# Patient Record
Sex: MALE | Race: OTHER | NOT HISPANIC OR LATINO
[De-identification: names, ages, dates, MRNs, and addresses within clinical notes are randomized per-mention and may not be internally consistent; named-entity substitution may affect disease eponyms.]

---

## 2017-01-10 ENCOUNTER — APPOINTMENT (OUTPATIENT)
Dept: HEART AND VASCULAR | Facility: CLINIC | Age: 52
End: 2017-01-10

## 2017-01-10 VITALS
HEIGHT: 69 IN | WEIGHT: 216 LBS | SYSTOLIC BLOOD PRESSURE: 134 MMHG | DIASTOLIC BLOOD PRESSURE: 86 MMHG | BODY MASS INDEX: 31.99 KG/M2 | HEART RATE: 67 BPM

## 2017-04-17 ENCOUNTER — APPOINTMENT (OUTPATIENT)
Dept: PULMONOLOGY | Facility: CLINIC | Age: 52
End: 2017-04-17

## 2017-04-17 VITALS
HEIGHT: 69 IN | RESPIRATION RATE: 17 BRPM | OXYGEN SATURATION: 98 % | SYSTOLIC BLOOD PRESSURE: 120 MMHG | WEIGHT: 205 LBS | BODY MASS INDEX: 30.36 KG/M2 | HEART RATE: 55 BPM | DIASTOLIC BLOOD PRESSURE: 80 MMHG

## 2017-05-16 ENCOUNTER — OUTPATIENT (OUTPATIENT)
Dept: OUTPATIENT SERVICES | Facility: HOSPITAL | Age: 52
LOS: 1 days | End: 2017-05-16
Payer: COMMERCIAL

## 2017-05-16 ENCOUNTER — APPOINTMENT (OUTPATIENT)
Dept: SLEEP CENTER | Facility: CLINIC | Age: 52
End: 2017-05-16

## 2017-05-16 PROCEDURE — 95810 POLYSOM 6/> YRS 4/> PARAM: CPT

## 2017-05-17 DIAGNOSIS — G47.33 OBSTRUCTIVE SLEEP APNEA (ADULT) (PEDIATRIC): ICD-10-CM

## 2017-05-30 ENCOUNTER — RESULT REVIEW (OUTPATIENT)
Age: 52
End: 2017-05-30

## 2017-05-30 ENCOUNTER — APPOINTMENT (OUTPATIENT)
Dept: PULMONOLOGY | Facility: CLINIC | Age: 52
End: 2017-05-30

## 2017-05-30 VITALS
BODY MASS INDEX: 31.1 KG/M2 | HEART RATE: 75 BPM | OXYGEN SATURATION: 96 % | WEIGHT: 210 LBS | DIASTOLIC BLOOD PRESSURE: 80 MMHG | RESPIRATION RATE: 17 BRPM | HEIGHT: 69 IN | SYSTOLIC BLOOD PRESSURE: 110 MMHG

## 2017-05-30 RX ORDER — CIPROFLOXACIN HYDROCHLORIDE 500 MG/1
500 TABLET, FILM COATED ORAL
Qty: 20 | Refills: 0 | Status: COMPLETED | COMMUNITY
Start: 2017-02-06

## 2017-05-30 RX ORDER — ALPRAZOLAM 0.5 MG/1
0.5 TABLET ORAL
Qty: 90 | Refills: 0 | Status: COMPLETED | COMMUNITY
Start: 2016-11-07

## 2017-05-30 RX ORDER — VALACYCLOVIR 1 G/1
1 TABLET, FILM COATED ORAL
Qty: 20 | Refills: 0 | Status: COMPLETED | COMMUNITY
Start: 2016-02-11

## 2017-06-03 ENCOUNTER — RESULT REVIEW (OUTPATIENT)
Age: 52
End: 2017-06-03

## 2017-07-06 ENCOUNTER — APPOINTMENT (OUTPATIENT)
Dept: SLEEP CENTER | Facility: CLINIC | Age: 52
End: 2017-07-06

## 2017-07-10 ENCOUNTER — OUTPATIENT (OUTPATIENT)
Dept: OUTPATIENT SERVICES | Facility: HOSPITAL | Age: 52
LOS: 1 days | End: 2017-07-10
Payer: COMMERCIAL

## 2017-07-10 PROCEDURE — G0400: CPT

## 2017-07-11 ENCOUNTER — APPOINTMENT (OUTPATIENT)
Dept: HEART AND VASCULAR | Facility: CLINIC | Age: 52
End: 2017-07-11

## 2017-07-11 VITALS
HEART RATE: 68 BPM | BODY MASS INDEX: 31.25 KG/M2 | DIASTOLIC BLOOD PRESSURE: 77 MMHG | SYSTOLIC BLOOD PRESSURE: 113 MMHG | HEIGHT: 69 IN | WEIGHT: 211 LBS

## 2017-07-14 DIAGNOSIS — G47.33 OBSTRUCTIVE SLEEP APNEA (ADULT) (PEDIATRIC): ICD-10-CM

## 2017-08-17 ENCOUNTER — APPOINTMENT (OUTPATIENT)
Dept: PULMONOLOGY | Facility: CLINIC | Age: 52
End: 2017-08-17
Payer: COMMERCIAL

## 2017-08-17 VITALS
HEART RATE: 72 BPM | SYSTOLIC BLOOD PRESSURE: 126 MMHG | BODY MASS INDEX: 30.36 KG/M2 | HEIGHT: 69 IN | RESPIRATION RATE: 17 BRPM | DIASTOLIC BLOOD PRESSURE: 80 MMHG | OXYGEN SATURATION: 97 % | WEIGHT: 205 LBS

## 2017-08-17 PROCEDURE — 99406 BEHAV CHNG SMOKING 3-10 MIN: CPT

## 2017-08-17 PROCEDURE — 94010 BREATHING CAPACITY TEST: CPT

## 2017-08-17 PROCEDURE — 99214 OFFICE O/P EST MOD 30 MIN: CPT | Mod: 25

## 2017-08-17 RX ORDER — DOXYCYCLINE 100 MG/1
100 CAPSULE ORAL
Qty: 14 | Refills: 0 | Status: DISCONTINUED | COMMUNITY
Start: 2017-05-30 | End: 2017-08-17

## 2017-12-14 ENCOUNTER — APPOINTMENT (OUTPATIENT)
Dept: PULMONOLOGY | Facility: CLINIC | Age: 52
End: 2017-12-14

## 2018-01-23 ENCOUNTER — APPOINTMENT (OUTPATIENT)
Dept: HEART AND VASCULAR | Facility: CLINIC | Age: 53
End: 2018-01-23
Payer: COMMERCIAL

## 2018-01-23 VITALS
DIASTOLIC BLOOD PRESSURE: 84 MMHG | HEIGHT: 69 IN | WEIGHT: 215 LBS | SYSTOLIC BLOOD PRESSURE: 140 MMHG | HEART RATE: 64 BPM | BODY MASS INDEX: 31.84 KG/M2

## 2018-01-23 PROCEDURE — 93282 PRGRMG EVAL IMPLANTABLE DFB: CPT

## 2018-01-23 PROCEDURE — 99214 OFFICE O/P EST MOD 30 MIN: CPT | Mod: 25

## 2018-01-23 RX ORDER — OLOPATADINE HYDROCHLORIDE 665 UG/1
0.6 SPRAY, METERED NASAL
Qty: 3 | Refills: 1 | Status: DISCONTINUED | COMMUNITY
Start: 2017-04-17 | End: 2018-01-23

## 2018-01-23 RX ORDER — LEVOFLOXACIN 500 MG/1
500 TABLET, FILM COATED ORAL
Qty: 10 | Refills: 0 | Status: DISCONTINUED | COMMUNITY
Start: 2017-05-26 | End: 2018-01-23

## 2018-01-23 RX ORDER — ALPRAZOLAM 2 MG/1
TABLET ORAL
Refills: 0 | Status: ACTIVE | COMMUNITY

## 2018-01-23 RX ORDER — OLOPATADINE HCL 1 MG/ML
0.1 SOLUTION/ DROPS OPHTHALMIC TWICE DAILY
Qty: 3 | Refills: 1 | Status: DISCONTINUED | COMMUNITY
Start: 2017-04-17 | End: 2018-01-23

## 2018-01-23 RX ORDER — DICLOFENAC SODIUM AND MISOPROSTOL 50; 200 MG/1; UG/1
50-0.2 TABLET, DELAYED RELEASE ORAL
Qty: 60 | Refills: 3 | Status: DISCONTINUED | COMMUNITY
Start: 2017-04-17 | End: 2018-01-23

## 2018-10-02 ENCOUNTER — APPOINTMENT (OUTPATIENT)
Dept: HEART AND VASCULAR | Facility: CLINIC | Age: 53
End: 2018-10-02
Payer: COMMERCIAL

## 2018-10-02 VITALS
BODY MASS INDEX: 33.33 KG/M2 | HEIGHT: 69 IN | HEART RATE: 69 BPM | WEIGHT: 225 LBS | DIASTOLIC BLOOD PRESSURE: 83 MMHG | SYSTOLIC BLOOD PRESSURE: 144 MMHG

## 2018-10-02 PROCEDURE — 99214 OFFICE O/P EST MOD 30 MIN: CPT | Mod: 25

## 2018-10-02 PROCEDURE — 93282 PRGRMG EVAL IMPLANTABLE DFB: CPT

## 2019-02-05 ENCOUNTER — APPOINTMENT (OUTPATIENT)
Dept: PULMONOLOGY | Facility: CLINIC | Age: 54
End: 2019-02-05
Payer: COMMERCIAL

## 2019-02-05 ENCOUNTER — NON-APPOINTMENT (OUTPATIENT)
Age: 54
End: 2019-02-05

## 2019-02-05 VITALS
WEIGHT: 236 LBS | BODY MASS INDEX: 34.96 KG/M2 | HEART RATE: 62 BPM | OXYGEN SATURATION: 97 % | HEIGHT: 69 IN | SYSTOLIC BLOOD PRESSURE: 110 MMHG | RESPIRATION RATE: 17 BRPM | DIASTOLIC BLOOD PRESSURE: 80 MMHG

## 2019-02-05 DIAGNOSIS — R09.82 POSTNASAL DRIP: ICD-10-CM

## 2019-02-05 DIAGNOSIS — J18.9 PNEUMONIA, UNSPECIFIED ORGANISM: ICD-10-CM

## 2019-02-05 DIAGNOSIS — Z87.891 PERSONAL HISTORY OF NICOTINE DEPENDENCE: ICD-10-CM

## 2019-02-05 PROCEDURE — 94010 BREATHING CAPACITY TEST: CPT

## 2019-02-05 PROCEDURE — 95012 NITRIC OXIDE EXP GAS DETER: CPT

## 2019-02-05 PROCEDURE — 99215 OFFICE O/P EST HI 40 MIN: CPT | Mod: 25

## 2019-02-05 NOTE — REASON FOR VISIT
[Follow-Up] : a follow-up visit [FreeTextEntry1] : abnormal chest CT, asthma, allergies, COPD, GERD, and prior b/l PNA

## 2019-02-05 NOTE — PROCEDURE
[FreeTextEntry1] : PFT revealed normal flows, with a FEV1 of  3.02  ,which is 81% of predicted, with a normal flow volume loop\par \par FENO was 21 ; a normal value being less than 25\par Fractional exhaled nitric oxide (FENO) is regarded as a simple, noninvasive method for assessing eosinophilic airway inflammation. Produced by a variety of cells within the lung, nitric oxide (NO) concentrations are generally low in healthy individuals. However, high concentrations of NO appear to be involved in nonspecific host defense mechanisms and chronic inflammatory diseases such as asthma. The American Thoracic Society (ATS) therefore has recommended using FENO to aid in the diagnosis and monitoring of eosinophilic airway inflammation and asthma, and for identifying steroid responsive individuals whose chronic respiratory symptoms may be caused by airway inflammation. \par  \par Chest CT (8.18.17) revealed stable CT of the chest with apical emphysema and multiple stable subcentimeter noncalcified pulmonary nodules measuring up to 6 mm. There is no new pulmonary nodule, thoracic lymphadenopathy, or active pulmonary parenchymal disease\par \par APAP Sleep study ( 7.10.17) revealed AHI was 5.7. Mask leakage was excessive. The 95th percentile PAP pressure was 11 cmH2O\par \par Sleep study (5.16.17) revealed severe SUMAYA was observed. CPAP titration polysomnography for treatment of sleep disordered breathing is suggested if clinically indicated \par

## 2019-02-05 NOTE — HISTORY OF PRESENT ILLNESS
[FreeTextEntry1] : Mr. Castellon is a 53 year old male presenting to the office today for a follow up visit for abnormal chest CT, asthma, allergies, COPD, GERD, and OSAS. 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

## 2019-02-05 NOTE — ADDENDUM
[FreeTextEntry1] : Documented by Noe Robins acting as a scribe for Dr. Kt Ac on 02/05/2019 \par \par All medical record entries made by the Scribe were at my, Dr. Kt Ac's, direction and personally dictated by me on 02/05/2019  . I have reviewed the chart and agree that the record accurately reflects my personal performance of the history, physical exam, procedure, assessment and plan. I have also personally directed, reviewed, and agree with the discharge instructions. \par \par

## 2019-02-05 NOTE — PHYSICAL EXAM

## 2019-02-05 NOTE — ASSESSMENT
[FreeTextEntry1] : Mr. Castellon has COPD, asthma, insomnia, OSAS allergies and sinus. Number one issue is still  his sleep and he has mild SOB \par \par problem 1: COPD/asthma\par -add Ventolin 2 inhalations QHS\par -add Trelegy 1 inhalation QD\par -on the shelf is Dulera\par \par -COPD is a progressive disease and although it can’t be cured , appropriate management can slow its progression, reduce frequency and severity of exacerbations, and improve symptoms and the patient quality of life. Hospitalizations are the greatest contributor to the total COPD costs and account for up to 87% of total COPD related costs. Exacerbations are the main cause of admissions and subsequently account for the 40-75% of COPD costs. Inhaled maintenance therapy reduces the incidence of exacerbations in patients with stable COPD. Incorrect inhaler use and nonadherence are major obstacles to achieving COPD treatment goals. Many COPD patients have challenges (impaired inhalation, limited dexterity, reduced cognition: that limit their ability to correctly use their COPD treatment devices resulting in reduced symptom control. Of most importance is smoking cessation and early intervention with respiratory illnesses and contemplation for pulmonary rehab to enhance quality of life.\par -Asthma is  believed to be caused by inherited (genetic) and environmental factor, but its exact cause is unknown. Asthma may be triggered by allergens, lung infections, or irritants in the air. Asthma triggers are different for each person\par \par problem 2: former smoking (resolved)\par -Discussed for five minutes with the patient the risks/associations with continued smoking including COPD, emphysema, shortness of breath, renal cancer, bladder cancer, stroke risk, cardiac disease, etc. Smoking cessation was discussed at length and highly encouraged. Various options to aid cessation was discussed including use of Chantix, Nicotrol, nicotine products, laser therapy, hypnosis, Wellbutrin, etc.\par \par problem 3 : allergic rhinitis\par -continue to use nasal saline\par -can continue Zyrtec 10 mg QHS \par -Flonase 1 sniff BID \par -continue  Olopatadine 1 sniff/nostril BID\par - continue Olopatadine eye drops \par \par -Environmental measures for allergies were encouraged including mattress and pillow cover, air purifier, and environmental controls.\par \par problem 5: abnormal chest CT (lung cancer screening CT)\par -follow up chest CT to be done at this time \par \par problem 6: Severe OSAS\par -he is awaiting his oral appliance  (insurance approval)\par -he is unable to tolerate CPAP\par -continue Lunesta 3 mg prn - 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  Cardiovascular disease risk includes arrhythmias, atrial fibrillation, hypertension, coronary artery disease, and stroke. Metabolic disorders include diabetes type 2, non-alcoholic fatty liver disease. Mood disorder especially depression; and cognitive decline especially in the elderly. Associations with  chronic reflux/Gamez’s esophagus some but not all inclusive. \par -Reasons to assess this include arousal consistent with hypopnea; respiratory events most prominent in REM sleep or supine position; therefore sleep staging and body position are important for accurate diagnosis and estimation of AHI.\par \par problem 7: overweight\par -Weight loss, exercise, and diet control were discussed and are highly encouraged. Treatment options were given such as, aqua therapy, and contacting a nutritionist. Recommended to use the elliptical, stationary bike, less use of treadmill.  Obesity is associated with worsening asthma, shortness of breath, and potential for cardiac disease, diabetes, and other underlying medical conditions. \par \par problem 8: GERD\par -diet controlled \par - -Things to avoid including overeating, spicy foods, tight clothing, eating within three hours of bed, this list is not all inclusive. \par -For treatment of reflux, possible options discussed including diet control, H2 blockers, PPIs, as well as coating motility agents discussed as treatment options. Timing of meals and proximity of last meal to sleep were discussed. If symptoms persist, a formal gastrointestinal evaluation is needed.\par \par F/U in 4 months\par He is encouraged to call with any changes, concerns, or questions.

## 2019-02-05 NOTE — REVIEW OF SYSTEMS
[Recent Wt Gain (___ Lbs)] : recent [unfilled] ~Ulb weight gain [As Noted in HPI] : as noted in HPI [Cough] : cough [Wheezing] : wheezing [Negative] : Sleep Disorder [FreeTextEntry8] : CAMPBELL

## 2019-03-07 ENCOUNTER — FORM ENCOUNTER (OUTPATIENT)
Age: 54
End: 2019-03-07

## 2019-03-08 ENCOUNTER — APPOINTMENT (OUTPATIENT)
Dept: CT IMAGING | Facility: CLINIC | Age: 54
End: 2019-03-08
Payer: COMMERCIAL

## 2019-03-08 ENCOUNTER — OUTPATIENT (OUTPATIENT)
Dept: OUTPATIENT SERVICES | Facility: HOSPITAL | Age: 54
LOS: 1 days | End: 2019-03-08
Payer: COMMERCIAL

## 2019-03-08 DIAGNOSIS — R93.89 ABNORMAL FINDINGS ON DIAGNOSTIC IMAGING OF OTHER SPECIFIED BODY STRUCTURES: ICD-10-CM

## 2019-03-08 DIAGNOSIS — Z00.8 ENCOUNTER FOR OTHER GENERAL EXAMINATION: ICD-10-CM

## 2019-03-08 PROCEDURE — 71250 CT THORAX DX C-: CPT | Mod: 26

## 2019-03-08 PROCEDURE — 71250 CT THORAX DX C-: CPT

## 2019-03-22 ENCOUNTER — TRANSCRIPTION ENCOUNTER (OUTPATIENT)
Age: 54
End: 2019-03-22

## 2019-04-09 ENCOUNTER — APPOINTMENT (OUTPATIENT)
Dept: HEART AND VASCULAR | Facility: CLINIC | Age: 54
End: 2019-04-09

## 2019-05-13 ENCOUNTER — FORM ENCOUNTER (OUTPATIENT)
Age: 54
End: 2019-05-13

## 2019-05-14 ENCOUNTER — APPOINTMENT (OUTPATIENT)
Dept: HEART AND VASCULAR | Facility: CLINIC | Age: 54
End: 2019-05-14
Payer: COMMERCIAL

## 2019-05-14 ENCOUNTER — APPOINTMENT (OUTPATIENT)
Dept: CARDIOTHORACIC SURGERY | Facility: CLINIC | Age: 54
End: 2019-05-14
Payer: COMMERCIAL

## 2019-05-14 ENCOUNTER — OUTPATIENT (OUTPATIENT)
Dept: OUTPATIENT SERVICES | Facility: HOSPITAL | Age: 54
LOS: 1 days | End: 2019-05-14
Payer: MEDICARE

## 2019-05-14 ENCOUNTER — NON-APPOINTMENT (OUTPATIENT)
Age: 54
End: 2019-05-14

## 2019-05-14 VITALS
DIASTOLIC BLOOD PRESSURE: 82 MMHG | BODY MASS INDEX: 33.33 KG/M2 | SYSTOLIC BLOOD PRESSURE: 140 MMHG | OXYGEN SATURATION: 97 % | WEIGHT: 225 LBS | HEIGHT: 69 IN | HEART RATE: 58 BPM | RESPIRATION RATE: 17 BRPM

## 2019-05-14 VITALS
SYSTOLIC BLOOD PRESSURE: 131 MMHG | DIASTOLIC BLOOD PRESSURE: 86 MMHG | BODY MASS INDEX: 33.33 KG/M2 | HEIGHT: 69 IN | HEART RATE: 59 BPM | WEIGHT: 225 LBS

## 2019-05-14 DIAGNOSIS — F17.290 NICOTINE DEPENDENCE, OTHER TOBACCO PRODUCT, UNCOMPLICATED: ICD-10-CM

## 2019-05-14 DIAGNOSIS — Z80.52 FAMILY HISTORY OF MALIGNANT NEOPLASM OF BLADDER: ICD-10-CM

## 2019-05-14 DIAGNOSIS — Z82.0 FAMILY HISTORY OF EPILEPSY AND OTHER DISEASES OF THE NERVOUS SYSTEM: ICD-10-CM

## 2019-05-14 DIAGNOSIS — T82.118A BREAKDOWN (MECHANICAL) OF OTHER CARDIAC ELECTRONIC DEVICE, INITIAL ENCOUNTER: ICD-10-CM

## 2019-05-14 LAB
ALBUMIN SERPL ELPH-MCNC: 4.4 G/DL — SIGNIFICANT CHANGE UP (ref 3.3–5)
ALP SERPL-CCNC: 47 U/L — SIGNIFICANT CHANGE UP (ref 40–120)
ALT FLD-CCNC: 20 U/L — SIGNIFICANT CHANGE UP (ref 10–45)
ANION GAP SERPL CALC-SCNC: 11 MMOL/L — SIGNIFICANT CHANGE UP (ref 5–17)
APPEARANCE UR: CLEAR — SIGNIFICANT CHANGE UP
APTT BLD: 31.4 SEC — SIGNIFICANT CHANGE UP (ref 27.5–36.3)
AST SERPL-CCNC: 16 U/L — SIGNIFICANT CHANGE UP (ref 10–40)
BASOPHILS # BLD AUTO: 0.03 K/UL — SIGNIFICANT CHANGE UP (ref 0–0.2)
BASOPHILS NFR BLD AUTO: 0.4 % — SIGNIFICANT CHANGE UP (ref 0–2)
BILIRUB SERPL-MCNC: 0.3 MG/DL — SIGNIFICANT CHANGE UP (ref 0.2–1.2)
BILIRUB UR-MCNC: NEGATIVE — SIGNIFICANT CHANGE UP
BLD GP AB SCN SERPL QL: NEGATIVE — SIGNIFICANT CHANGE UP
BUN SERPL-MCNC: 18 MG/DL — SIGNIFICANT CHANGE UP (ref 7–23)
CALCIUM SERPL-MCNC: 9.1 MG/DL — SIGNIFICANT CHANGE UP (ref 8.4–10.5)
CHLORIDE SERPL-SCNC: 104 MMOL/L — SIGNIFICANT CHANGE UP (ref 96–108)
CHOLEST SERPL-MCNC: 198 MG/DL — SIGNIFICANT CHANGE UP (ref 10–199)
CO2 SERPL-SCNC: 21 MMOL/L — LOW (ref 22–31)
COLOR SPEC: YELLOW — SIGNIFICANT CHANGE UP
CREAT SERPL-MCNC: 0.87 MG/DL — SIGNIFICANT CHANGE UP (ref 0.5–1.3)
DIFF PNL FLD: NEGATIVE — SIGNIFICANT CHANGE UP
EOSINOPHIL # BLD AUTO: 0.18 K/UL — SIGNIFICANT CHANGE UP (ref 0–0.5)
EOSINOPHIL NFR BLD AUTO: 2.4 % — SIGNIFICANT CHANGE UP (ref 0–6)
GLUCOSE SERPL-MCNC: 90 MG/DL — SIGNIFICANT CHANGE UP (ref 70–99)
GLUCOSE UR QL: NEGATIVE — SIGNIFICANT CHANGE UP
HBA1C BLD-MCNC: 5.8 % — HIGH (ref 4–5.6)
HBV SURFACE AG SER-ACNC: SIGNIFICANT CHANGE UP
HCT VFR BLD CALC: 44.5 % — SIGNIFICANT CHANGE UP (ref 39–50)
HDLC SERPL-MCNC: 46 MG/DL — SIGNIFICANT CHANGE UP
HGB BLD-MCNC: 14.6 G/DL — SIGNIFICANT CHANGE UP (ref 13–17)
IMM GRANULOCYTES NFR BLD AUTO: 0.4 % — SIGNIFICANT CHANGE UP (ref 0–1.5)
INR BLD: 1.03 — SIGNIFICANT CHANGE UP (ref 0.88–1.16)
KETONES UR-MCNC: NEGATIVE — SIGNIFICANT CHANGE UP
LEUKOCYTE ESTERASE UR-ACNC: NEGATIVE — SIGNIFICANT CHANGE UP
LIPID PNL WITH DIRECT LDL SERPL: 138 MG/DL — HIGH
LYMPHOCYTES # BLD AUTO: 1.85 K/UL — SIGNIFICANT CHANGE UP (ref 1–3.3)
LYMPHOCYTES # BLD AUTO: 24.3 % — SIGNIFICANT CHANGE UP (ref 13–44)
MCHC RBC-ENTMCNC: 28.9 PG — SIGNIFICANT CHANGE UP (ref 27–34)
MCHC RBC-ENTMCNC: 32.8 GM/DL — SIGNIFICANT CHANGE UP (ref 32–36)
MCV RBC AUTO: 87.9 FL — SIGNIFICANT CHANGE UP (ref 80–100)
MONOCYTES # BLD AUTO: 0.59 K/UL — SIGNIFICANT CHANGE UP (ref 0–0.9)
MONOCYTES NFR BLD AUTO: 7.7 % — SIGNIFICANT CHANGE UP (ref 2–14)
NEUTROPHILS # BLD AUTO: 4.94 K/UL — SIGNIFICANT CHANGE UP (ref 1.8–7.4)
NEUTROPHILS NFR BLD AUTO: 64.8 % — SIGNIFICANT CHANGE UP (ref 43–77)
NITRITE UR-MCNC: NEGATIVE — SIGNIFICANT CHANGE UP
NRBC # BLD: 0 /100 WBCS — SIGNIFICANT CHANGE UP (ref 0–0)
PH UR: 6 — SIGNIFICANT CHANGE UP (ref 5–8)
PLATELET # BLD AUTO: 267 K/UL — SIGNIFICANT CHANGE UP (ref 150–400)
POTASSIUM SERPL-MCNC: 4.2 MMOL/L — SIGNIFICANT CHANGE UP (ref 3.5–5.3)
POTASSIUM SERPL-SCNC: 4.2 MMOL/L — SIGNIFICANT CHANGE UP (ref 3.5–5.3)
PROT SERPL-MCNC: 7.8 G/DL — SIGNIFICANT CHANGE UP (ref 6–8.3)
PROT UR-MCNC: NEGATIVE MG/DL — SIGNIFICANT CHANGE UP
PROTHROM AB SERPL-ACNC: 11.6 SEC — SIGNIFICANT CHANGE UP (ref 10–12.9)
RBC # BLD: 5.06 M/UL — SIGNIFICANT CHANGE UP (ref 4.2–5.8)
RBC # FLD: 12.6 % — SIGNIFICANT CHANGE UP (ref 10.3–14.5)
RH IG SCN BLD-IMP: POSITIVE — SIGNIFICANT CHANGE UP
SODIUM SERPL-SCNC: 136 MMOL/L — SIGNIFICANT CHANGE UP (ref 135–145)
SP GR SPEC: 1.02 — SIGNIFICANT CHANGE UP (ref 1–1.03)
TOTAL CHOLESTEROL/HDL RATIO MEASUREMENT: 4.3 RATIO — SIGNIFICANT CHANGE UP (ref 3.4–9.6)
TRIGL SERPL-MCNC: 71 MG/DL — SIGNIFICANT CHANGE UP (ref 10–149)
TSH SERPL-MCNC: 1.18 UIU/ML — SIGNIFICANT CHANGE UP (ref 0.35–4.94)
UROBILINOGEN FLD QL: 0.2 E.U./DL — SIGNIFICANT CHANGE UP
WBC # BLD: 7.62 K/UL — SIGNIFICANT CHANGE UP (ref 3.8–10.5)
WBC # FLD AUTO: 7.62 K/UL — SIGNIFICANT CHANGE UP (ref 3.8–10.5)

## 2019-05-14 PROCEDURE — 80061 LIPID PANEL: CPT

## 2019-05-14 PROCEDURE — 86901 BLOOD TYPING SEROLOGIC RH(D): CPT

## 2019-05-14 PROCEDURE — 99204 OFFICE O/P NEW MOD 45 MIN: CPT

## 2019-05-14 PROCEDURE — 71046 X-RAY EXAM CHEST 2 VIEWS: CPT | Mod: 26

## 2019-05-14 PROCEDURE — 36415 COLL VENOUS BLD VENIPUNCTURE: CPT

## 2019-05-14 PROCEDURE — 84443 ASSAY THYROID STIM HORMONE: CPT

## 2019-05-14 PROCEDURE — 93282 PRGRMG EVAL IMPLANTABLE DFB: CPT

## 2019-05-14 PROCEDURE — 80053 COMPREHEN METABOLIC PANEL: CPT

## 2019-05-14 PROCEDURE — 86850 RBC ANTIBODY SCREEN: CPT

## 2019-05-14 PROCEDURE — 86900 BLOOD TYPING SEROLOGIC ABO: CPT

## 2019-05-14 PROCEDURE — 71046 X-RAY EXAM CHEST 2 VIEWS: CPT

## 2019-05-14 PROCEDURE — 99214 OFFICE O/P EST MOD 30 MIN: CPT | Mod: 25

## 2019-05-14 PROCEDURE — 85730 THROMBOPLASTIN TIME PARTIAL: CPT

## 2019-05-14 PROCEDURE — 85610 PROTHROMBIN TIME: CPT

## 2019-05-14 PROCEDURE — 83036 HEMOGLOBIN GLYCOSYLATED A1C: CPT

## 2019-05-14 PROCEDURE — 87340 HEPATITIS B SURFACE AG IA: CPT

## 2019-05-14 PROCEDURE — 85025 COMPLETE CBC W/AUTO DIFF WBC: CPT

## 2019-05-14 PROCEDURE — 81003 URINALYSIS AUTO W/O SCOPE: CPT

## 2019-05-14 PROCEDURE — 93000 ELECTROCARDIOGRAM COMPLETE: CPT

## 2019-05-14 RX ORDER — CEPHALEXIN 500 MG/1
500 CAPSULE ORAL
Qty: 20 | Refills: 0 | Status: COMPLETED | COMMUNITY
Start: 2019-05-03

## 2019-05-14 RX ORDER — SULFAMETHOXAZOLE AND TRIMETHOPRIM 800; 160 MG/1; MG/1
800-160 TABLET ORAL
Qty: 10 | Refills: 0 | Status: COMPLETED | COMMUNITY
Start: 2019-05-09

## 2019-05-14 RX ORDER — FLUTICASONE FUROATE, UMECLIDINIUM BROMIDE AND VILANTEROL TRIFENATATE 100; 62.5; 25 UG/1; UG/1; UG/1
100-62.5-25 POWDER RESPIRATORY (INHALATION)
Qty: 3 | Refills: 1 | Status: DISCONTINUED | COMMUNITY
Start: 2019-02-05 | End: 2019-05-14

## 2019-05-14 RX ORDER — BENZOCAINE 20 G/100G
100 GEL, DENTIFRICE ORAL
Qty: 30 | Refills: 0 | Status: COMPLETED | COMMUNITY
Start: 2019-04-22

## 2019-05-14 RX ORDER — SILDENAFIL CITRATE 100 MG/1
TABLET, FILM COATED ORAL
Refills: 0 | Status: DISCONTINUED | COMMUNITY
End: 2019-05-14

## 2019-05-14 RX ORDER — ALBUTEROL SULFATE 90 UG/1
108 (90 BASE) AEROSOL, METERED RESPIRATORY (INHALATION) EVERY 6 HOURS
Qty: 1 | Refills: 3 | Status: DISCONTINUED | COMMUNITY
Start: 2019-02-05 | End: 2019-05-14

## 2019-05-14 RX ORDER — OXYCODONE 5 MG/1
5 TABLET ORAL
Qty: 12 | Refills: 0 | Status: COMPLETED | COMMUNITY
Start: 2019-04-22

## 2019-05-14 NOTE — PROCEDURE
[No] : not [NSR] : normal sinus rhythm [ICD] : Implantable cardioverter-defibrillator [Medtronic] : Medtronic [VVI] : VVI [Voltage: ___ volts] : Voltage was [unfilled] volts [Lead Imp:  ___ohms] : lead impedance was [unfilled] ohms [Sensing Amplitude ___mv] : sensing amplitude was [unfilled] mv [___V @] : [unfilled] V [___ ms] : [unfilled] ms [None] : none [Sense ___ %] : Sense [unfilled]% [de-identified] : Protecta [de-identified] : 2/21/13 [de-identified] : 40

## 2019-05-14 NOTE — END OF VISIT
[] : Nurse Practitioner [>50% of Time Spent on Counseling for ____] : Greater than 50% of the encounter time was spent on counseling for [unfilled] [>50% of Time Spent on Counseling and Coordination of Care for  ___] : Greater than 50% of the encounter time was spent on counseling and coordination of care for [unfilled] [Time Spent: ___ minutes] : I have spent [unfilled] minutes of face to face time with the patient

## 2019-05-14 NOTE — PHYSICAL EXAM
[General Appearance - Well Developed] : well developed [Normal Appearance] : normal appearance [Well Groomed] : well groomed [General Appearance - Well Nourished] : well nourished [No Deformities] : no deformities [General Appearance - In No Acute Distress] : no acute distress [Left Infraclavicular] : left infraclavicular area [Clean] : clean [Dry] : dry [Well-Healed] : well-healed [Palpable Crepitus] : no palpable crepitus [Bleeding] : no active bleeding [Purulent Drainage] : no purulent drainage [Foul Odor] : no foul smell [Erythema] : not erythematous [Serous Drainage] : no serous drainage [Serosanguineous Drainage] : no serosanquineous drainage [Warm] : not warm [Tender] : not tender [Indurated] : not indurated [Fluctuant] : not fluctuant

## 2019-05-14 NOTE — HISTORY OF PRESENT ILLNESS
[None] : The patient complains of no symptoms [de-identified] : This is a 52 yo man with a history of VT S/P ICD implant with ICD generator change 2/21/13.  His RV lead is a [employer] [place] [id number], which is the subject of industry alert.  Reports he feels "PVCs" occasionally while resting; no sustained rapid/irregular heart action.  Compliant with Atenolol.  No presyncope/syncope or ICD shocks.  \par \par Presents today for f/u after hearing his device beeping.  Remote monitoring significant for RV lead integrity alert and noise on RV lead.

## 2019-05-15 PROBLEM — T82.118A ICD (IMPLANTABLE CARDIOVERTER-DEFIBRILLATOR) MALFUNCTION: Status: ACTIVE | Noted: 2019-05-14

## 2019-05-16 NOTE — H&P ADULT - NSHPREVIEWOFSYSTEMS_GEN_ALL_CORE
Review of Systems  CONSTITUTIONAL:  Denies Fevers / chills, sweats, fatigue, weight loss, weight gain                                      NEURO:  Denies parathesias, seizures, syncope, confusion                                                                                EYES:  Denies Blurry vision, discharge, pain, loss of vision                                                                                    ENMT:  Denies Difficulty hearing, vertigo, dysphagia, epistaxis, recent dental work                                       CV:  Denies Chest pain, palpitations, CAMPBELL, orthopnea                                                                                          RESPIRATORY:  Denies Wheezing, SOB, cough / sputum, hemoptysis                                                                GI:  Denies Nausea, vommiting, diarrhea, constipation, melena, difficulty swallowing                                               : Denies Hematuria, dysuria, urgency, incontinence                                                                                         MUSKULOSKELETAL:  Denies arthritis, joint swelling, muscle weakness                                                             SKIN/BREAST:  Denies rash, itching, janiya loss, masses                                                                                            PSYCH:  Denies depresion, anxiety, suicidal ideation                                                                                               HEME/LYMPH:  Denies bruises easily, enlarged lymph nodes, tender lymph nodes                                        ENDOCRINE:  Denies cold intolerance, heat intolerance, polydipsia

## 2019-05-16 NOTE — H&P ADULT - NSICDXPASTMEDICALHX_GEN_ALL_CORE_FT
PAST MEDICAL HISTORY:  H/O ventricular tachycardia     Malfunction of implantable cardioverter-defibrillator (ICD) electrode     SUMAYA (obstructive sleep apnea)     S/P ICD (internal cardiac defibrillator) procedure

## 2019-05-16 NOTE — H&P ADULT - NSHPPHYSICALEXAM_GEN_ALL_CORE
Vital Signs   	Recorded: [id number] 04:09PM  Systolic	140  Diastolic	82  Height	5 ft 9 in  Weight	225 lb   BMI Calculated	33.23  BSA Calculated	2.17  Heart Rate	58  Respiration	17  O2 Saturation	97    Physical Exam  Constitutional: alert and in no acute distress.   Eyes: the sclera and conjunctiva were normal, pupils were equal in size, round, and reactive to light and extraocular movements were intact.   ENT: the ears and nose were normal in appearance . the oropharynx was normal.   Neck: the appearance of the neck was normal, no neck mass was observed, the thyroid was not enlarged and there were no palpable thyroid nodules . there was no jugular-venous distention.   Pulmonary: no respiratory distress and lungs were clear to auscultation bilaterally.   Heart: heart rate was normal and rhythm regular, normal S1 and S2, no gallops, no murmurs and no pericardial rub.   Chest: the chest was normal in appearance, no chest asymmetry and normal chest expansion. ICD left chest wall.   Vascular: Carotid: right 2+, left 2+, no bruit heard over the right carotid and no bruit heard over the left carotid. Radial: right 2+ and left 2+.   Abdominal Aorta: the abdominal aorta was not enlarged and no bruit was heard.   Breasts:. deferred.   Abdomen: normal bowel sounds, soft, non-tender, no hepato-splenomegaly and no abdominal mass palpated.   Genitourinary:. deferred.   Lymphatics: The posterior cervical and anterior cervical nodes were non-tender and normal size.   Back: no costovertebral angle tenderness and no spinal tenderness.   Musculoskeletal: normal gait, no clubbing or cyanosis of the fingernails, no joint swelling seen and muscle strength and tone were normal.   Skin: normal skin color and pigmentation, normal skin turgor and no rash.   Neurological: deep tendon reflexes were 2+ and symmetric, the sensory exam was normal to light touch and pinprick and no focal deficits.   Psychiatric: oriented to person, place, and time, insight and judgment were intact and the affect was normal.

## 2019-05-16 NOTE — H&P ADULT - NSICDXPASTSURGICALHX_GEN_ALL_CORE_FT
PAST SURGICAL HISTORY:  H/O knee surgery     H/O shoulder surgery     S/P ICD (internal cardiac defibrillator) procedure PAST SURGICAL HISTORY:  H/O knee surgery     H/O shoulder surgery     S/P ICD (internal cardiac defibrillator) procedure     S/P vasectomy

## 2019-05-16 NOTE — H&P ADULT - HISTORY OF PRESENT ILLNESS
53 year old male, former smoker (smoked for 30yrs, 1PPD, quit 10/2018), with a past medical history of SUMAYA (unable to tolerate CPAP, improved after 30lbs weight loss), history of VT S/P ICD implant 1/10/2005, with ICD generator change 2/21/13 presents for surgical consult. Patient's RV lead is a Sprint [place] [id number] which is the subject of industry alert. He has been hearing beeping from the device since 5/9/19.     Otherwise, patient is doing well. He denies fever, chills, fatigue, headache, blurred vision, dizziness, syncope, chest pain, palpitations, shortness of breath, orthopnea, paroxysmal nocturnal dyspnea, nausea, vomiting, abdominal pain, back pain, BRBPR or swelling to legs.    Of note, patient underwent a vasectomy ~ 3 wks ago. He is currently on PO antibiotics (Keflex, Bactrim) for infected cyst which has healed.     The patient was evaluated by CT surgery and is deemed a surgical candidate for Laser lead extraction of RV lead, implantation of new ICD (Medtronic). 53 year old male, former smoker (smoked for 30yrs, 1PPD, quit 10/2018), with a past medical history of SUMAYA (unable to tolerate CPAP, improved after 30lbs weight loss), history of VT S/P ICD implant 1/10/2005, with ICD generator change 2/21/13 presents for surgical consult. Patient's RV lead is a Sprint Andrea [id number] which is the subject of industry alert. He has been hearing beeping from the device since 5/9/19.     Otherwise, patient is doing well. He denies fever, chills, fatigue, headache, blurred vision, dizziness, syncope, chest pain, palpitations, shortness of breath, orthopnea, paroxysmal nocturnal dyspnea, nausea, vomiting, abdominal pain, back pain, BRBPR or swelling to legs.    Of note, patient underwent a vasectomy ~ 3 wks ago. He is currently on PO antibiotics (Keflex, Bactrim) for infected cyst which has healed.     The patient was evaluated by CT surgery and is deemed a surgical candidate for Laser lead extraction of RV lead, implantation of new ICD (Medtronic).  Procedure is scheduled for today. 53 year old male, former smoker (smoked for 30yrs, 1PPD, quit 10/2018), with a past medical history of SUMAYA (unable to tolerate CPAP, improved after 30lbs weight loss), history of VT S/P ICD implant 1/10/2005, with ICD generator change 2/21/13 presents for surgical consult. Patient's RV lead is a Sprint Andrea [id number] which is the subject of industry alert. He has been hearing beeping from the device since 5/9/19.     Otherwise, patient is doing well. He denies fever, chills, fatigue, headache, blurred vision, dizziness, syncope, chest pain, palpitations, shortness of breath, orthopnea, paroxysmal nocturnal dyspnea, nausea, vomiting, abdominal pain, back pain, BRBPR or swelling to legs.    Of note, patient underwent a vasectomy ~ 3 wks ago. He is currently on PO antibiotics (Keflex, Bactrim) for infected cyst which has healed.     The patient was evaluated by CT surgery and is deemed a surgical candidate for Laser lead extraction of RV lead, implantation of new ICD (Medtronic).  Procedure is scheduled for today.      Patient seen in same day holding area; Reports no changes to PMHx or medications since last seen by our team. Denies acute or current SOB, chest pain, palpitation, N/V/D, fever/chills, recent illness, or any other concerning symptoms.      Pt last ate at 7pm.    Took no medication this AM.  Did use cleaning wipes as directed.  Feels well. 53 year old male, former smoker (smoked for 30yrs, 1PPD, quit 10/2018), with a past medical history of SUMAYA (unable to tolerate CPAP, improved after 30lbs weight loss), history of VT S/P ICD implant 1/10/2005, with ICD generator change 2/21/13 presents for surgical consult. Patient's RV lead is a Sprint Andrea [id number] which is the subject of industry alert. He has been hearing beeping from the device since 5/9/19.     Otherwise, patient is doing well. He denies fever, chills, fatigue, headache, blurred vision, dizziness, syncope, chest pain, palpitations, shortness of breath, orthopnea, paroxysmal nocturnal dyspnea, nausea, vomiting, abdominal pain, back pain, BRBPR or swelling to legs.    Of note, patient underwent a vasectomy recently.  He completed PO antibiotics (Keflex, Bactrim) for infected cyst which has healed and he is without complaints..     The patient was evaluated by CT surgery and is deemed a surgical candidate for Laser lead extraction of RV lead, implantation of new ICD (Medtronic).  Procedure is scheduled for today.      Patient seen in same day holding area; Reports no changes to PMHx or medications since last seen by our team. Denies acute or current SOB, chest pain, palpitation, N/V/D, fever/chills, recent illness, or any other concerning symptoms.      Pt last ate at 7pm.    Took no medication this AM.  Did use cleaning wipes as directed.  Feels well.

## 2019-05-16 NOTE — H&P ADULT - NSHPLABSRESULTS_GEN_ALL_CORE
Device Check The patient is not pacemaker dependent.   Underlying Rhythm: normal sinus rhythm.   Device Type: Implantable cardioverter-defibrillator   Pacemaker/ICD : Medtronic.   Model: Protecta. Date of Implant: 2/21/13.   Mode: VVI. Rate: 40.   Battery Status: Voltage was 3.01 volts   Rt Ventricle:. lead impedance was 513 ohms. sensing amplitude was 11.8 mv. Pacing Threshold: 2 V @ 0.4 ms.   Program Changes: none.   Single Chamber: Sense 100%   Comments:. No V events  No short V-V intervals  V pacing threshold with slight increase as of the fall of 2014.    Chest xray performed. result pending. 5/14/19.     Labs from 5/14/19                            14.6  	7.62   )-----------( 179     	          44.5  	    	136  |  104  |  18  	----------------------------<  267  	4.2|  21  |  0.87                TSH 1.176                             Mitch 9.1    	TPro  7.8  /  Alb  4.4  /  TBili  0.3  /   AST  16  /  ALT  20  /  AlkPhos  47                Hemoglobin A1C 5.8

## 2019-05-16 NOTE — H&P ADULT - NSHPSOCIALHISTORY_GEN_ALL_CORE
Cigar smoker  No alcohol use  No drug use  Recently quit tobacco use (October 2018) Cigar smoker  No alcohol use  No drug use  Recently quit tobacco use (October 2018)  Lives with Yumiko

## 2019-05-17 VITALS
RESPIRATION RATE: 15 BRPM | HEART RATE: 57 BPM | DIASTOLIC BLOOD PRESSURE: 73 MMHG | TEMPERATURE: 98 F | WEIGHT: 223.11 LBS | HEIGHT: 69 IN | SYSTOLIC BLOOD PRESSURE: 117 MMHG

## 2019-05-17 NOTE — PRE-OP CHECKLIST - SELECT TESTS ORDERED
CMP/PT/PTT/Spirometry/Type and Screen/CXR/Urinalysis/CBC/INR/EKG CMP/INR/Spirometry/EKG/CXR/PT/PTT/Type and Screen/BMP/CBC/Urinalysis

## 2019-05-20 ENCOUNTER — INPATIENT (INPATIENT)
Facility: HOSPITAL | Age: 54
LOS: 0 days | Discharge: HOME | DRG: 227 | End: 2019-05-21
Attending: THORACIC SURGERY (CARDIOTHORACIC VASCULAR SURGERY) | Admitting: THORACIC SURGERY (CARDIOTHORACIC VASCULAR SURGERY)
Payer: COMMERCIAL

## 2019-05-20 ENCOUNTER — RESULT REVIEW (OUTPATIENT)
Age: 54
End: 2019-05-20

## 2019-05-20 ENCOUNTER — APPOINTMENT (OUTPATIENT)
Dept: CARDIOTHORACIC SURGERY | Facility: HOSPITAL | Age: 54
End: 2019-05-20

## 2019-05-20 DIAGNOSIS — Z98.52 VASECTOMY STATUS: Chronic | ICD-10-CM

## 2019-05-20 DIAGNOSIS — Z98.890 OTHER SPECIFIED POSTPROCEDURAL STATES: Chronic | ICD-10-CM

## 2019-05-20 DIAGNOSIS — Z95.810 PRESENCE OF AUTOMATIC (IMPLANTABLE) CARDIAC DEFIBRILLATOR: Chronic | ICD-10-CM

## 2019-05-20 LAB
ALBUMIN SERPL ELPH-MCNC: 3.7 G/DL — SIGNIFICANT CHANGE UP (ref 3.3–5)
ALP SERPL-CCNC: 44 U/L — SIGNIFICANT CHANGE UP (ref 40–120)
ALT FLD-CCNC: 15 U/L — SIGNIFICANT CHANGE UP (ref 10–45)
ANION GAP SERPL CALC-SCNC: 9 MMOL/L — SIGNIFICANT CHANGE UP (ref 5–17)
AST SERPL-CCNC: 13 U/L — SIGNIFICANT CHANGE UP (ref 10–40)
BASOPHILS # BLD AUTO: 0.03 K/UL — SIGNIFICANT CHANGE UP (ref 0–0.2)
BASOPHILS NFR BLD AUTO: 0.3 % — SIGNIFICANT CHANGE UP (ref 0–2)
BILIRUB SERPL-MCNC: 0.3 MG/DL — SIGNIFICANT CHANGE UP (ref 0.2–1.2)
BUN SERPL-MCNC: 14 MG/DL — SIGNIFICANT CHANGE UP (ref 7–23)
CALCIUM SERPL-MCNC: 8.6 MG/DL — SIGNIFICANT CHANGE UP (ref 8.4–10.5)
CHLORIDE SERPL-SCNC: 105 MMOL/L — SIGNIFICANT CHANGE UP (ref 96–108)
CO2 SERPL-SCNC: 24 MMOL/L — SIGNIFICANT CHANGE UP (ref 22–31)
CREAT SERPL-MCNC: 0.71 MG/DL — SIGNIFICANT CHANGE UP (ref 0.5–1.3)
EOSINOPHIL # BLD AUTO: 0.16 K/UL — SIGNIFICANT CHANGE UP (ref 0–0.5)
EOSINOPHIL NFR BLD AUTO: 1.6 % — SIGNIFICANT CHANGE UP (ref 0–6)
GLUCOSE SERPL-MCNC: 114 MG/DL — HIGH (ref 70–99)
HCT VFR BLD CALC: 41.4 % — SIGNIFICANT CHANGE UP (ref 39–50)
HGB BLD-MCNC: 13.4 G/DL — SIGNIFICANT CHANGE UP (ref 13–17)
IMM GRANULOCYTES NFR BLD AUTO: 0.3 % — SIGNIFICANT CHANGE UP (ref 0–1.5)
LYMPHOCYTES # BLD AUTO: 1.1 K/UL — SIGNIFICANT CHANGE UP (ref 1–3.3)
LYMPHOCYTES # BLD AUTO: 10.8 % — LOW (ref 13–44)
MCHC RBC-ENTMCNC: 28.2 PG — SIGNIFICANT CHANGE UP (ref 27–34)
MCHC RBC-ENTMCNC: 32.4 GM/DL — SIGNIFICANT CHANGE UP (ref 32–36)
MCV RBC AUTO: 87 FL — SIGNIFICANT CHANGE UP (ref 80–100)
MONOCYTES # BLD AUTO: 0.2 K/UL — SIGNIFICANT CHANGE UP (ref 0–0.9)
MONOCYTES NFR BLD AUTO: 2 % — SIGNIFICANT CHANGE UP (ref 2–14)
NEUTROPHILS # BLD AUTO: 8.68 K/UL — HIGH (ref 1.8–7.4)
NEUTROPHILS NFR BLD AUTO: 85 % — HIGH (ref 43–77)
NRBC # BLD: 0 /100 WBCS — SIGNIFICANT CHANGE UP (ref 0–0)
PLATELET # BLD AUTO: 232 K/UL — SIGNIFICANT CHANGE UP (ref 150–400)
POTASSIUM SERPL-MCNC: 4.1 MMOL/L — SIGNIFICANT CHANGE UP (ref 3.5–5.3)
POTASSIUM SERPL-SCNC: 4.1 MMOL/L — SIGNIFICANT CHANGE UP (ref 3.5–5.3)
PROT SERPL-MCNC: 6.9 G/DL — SIGNIFICANT CHANGE UP (ref 6–8.3)
RBC # BLD: 4.76 M/UL — SIGNIFICANT CHANGE UP (ref 4.2–5.8)
RBC # FLD: 12.3 % — SIGNIFICANT CHANGE UP (ref 10.3–14.5)
SODIUM SERPL-SCNC: 138 MMOL/L — SIGNIFICANT CHANGE UP (ref 135–145)
WBC # BLD: 10.2 K/UL — SIGNIFICANT CHANGE UP (ref 3.8–10.5)
WBC # FLD AUTO: 10.2 K/UL — SIGNIFICANT CHANGE UP (ref 3.8–10.5)

## 2019-05-20 PROCEDURE — 71045 X-RAY EXAM CHEST 1 VIEW: CPT | Mod: 26

## 2019-05-20 PROCEDURE — 93010 ELECTROCARDIOGRAM REPORT: CPT

## 2019-05-20 PROCEDURE — 33244 REMOVE ELCTRD TRANSVENOUSLY: CPT

## 2019-05-20 PROCEDURE — 33249 INSJ/RPLCMT DEFIB W/LEAD(S): CPT

## 2019-05-20 PROCEDURE — ZZZZZ: CPT

## 2019-05-20 PROCEDURE — 93641 EP EVL 1/2CHMB PAC CVDFB TST: CPT | Mod: 26

## 2019-05-20 RX ORDER — SODIUM CHLORIDE 9 MG/ML
1000 INJECTION INTRAMUSCULAR; INTRAVENOUS; SUBCUTANEOUS
Refills: 0 | Status: DISCONTINUED | OUTPATIENT
Start: 2019-05-20 | End: 2019-05-20

## 2019-05-20 RX ORDER — FAMOTIDINE 10 MG/ML
20 INJECTION INTRAVENOUS
Refills: 0 | Status: DISCONTINUED | OUTPATIENT
Start: 2019-05-20 | End: 2019-05-21

## 2019-05-20 RX ORDER — CEFAZOLIN SODIUM 1 G
2000 VIAL (EA) INJECTION EVERY 8 HOURS
Refills: 0 | Status: DISCONTINUED | OUTPATIENT
Start: 2019-05-20 | End: 2019-05-20

## 2019-05-20 RX ORDER — OXYCODONE AND ACETAMINOPHEN 5; 325 MG/1; MG/1
2 TABLET ORAL EVERY 6 HOURS
Refills: 0 | Status: DISCONTINUED | OUTPATIENT
Start: 2019-05-20 | End: 2019-05-21

## 2019-05-20 RX ORDER — SODIUM CHLORIDE 9 MG/ML
500 INJECTION, SOLUTION INTRAVENOUS
Refills: 0 | Status: DISCONTINUED | OUTPATIENT
Start: 2019-05-20 | End: 2019-05-20

## 2019-05-20 RX ORDER — FAMOTIDINE 10 MG/ML
20 INJECTION INTRAVENOUS EVERY 12 HOURS
Refills: 0 | Status: DISCONTINUED | OUTPATIENT
Start: 2019-05-20 | End: 2019-05-20

## 2019-05-20 RX ORDER — ALPRAZOLAM 0.25 MG
0 TABLET ORAL
Qty: 0 | Refills: 0 | DISCHARGE

## 2019-05-20 RX ORDER — HEPARIN SODIUM 5000 [USP'U]/ML
5000 INJECTION INTRAVENOUS; SUBCUTANEOUS EVERY 8 HOURS
Refills: 0 | Status: DISCONTINUED | OUTPATIENT
Start: 2019-05-20 | End: 2019-05-21

## 2019-05-20 RX ORDER — MORPHINE SULFATE 50 MG/1
2 CAPSULE, EXTENDED RELEASE ORAL ONCE
Refills: 0 | Status: COMPLETED | OUTPATIENT
Start: 2019-05-20 | End: 2019-05-20

## 2019-05-20 RX ORDER — DOCUSATE SODIUM 100 MG
100 CAPSULE ORAL
Refills: 0 | Status: DISCONTINUED | OUTPATIENT
Start: 2019-05-20 | End: 2019-05-21

## 2019-05-20 RX ORDER — CEFAZOLIN SODIUM 1 G
2000 VIAL (EA) INJECTION EVERY 8 HOURS
Refills: 0 | Status: DISCONTINUED | OUTPATIENT
Start: 2019-05-20 | End: 2019-05-21

## 2019-05-20 RX ORDER — ACETAMINOPHEN 500 MG
1000 TABLET ORAL ONCE
Refills: 0 | Status: DISCONTINUED | OUTPATIENT
Start: 2019-05-20 | End: 2019-05-21

## 2019-05-20 RX ORDER — SENNA PLUS 8.6 MG/1
2 TABLET ORAL AT BEDTIME
Refills: 0 | Status: DISCONTINUED | OUTPATIENT
Start: 2019-05-20 | End: 2019-05-21

## 2019-05-20 RX ORDER — OXYCODONE AND ACETAMINOPHEN 5; 325 MG/1; MG/1
1 TABLET ORAL EVERY 6 HOURS
Refills: 0 | Status: DISCONTINUED | OUTPATIENT
Start: 2019-05-20 | End: 2019-05-21

## 2019-05-20 RX ORDER — ACETAMINOPHEN 500 MG
650 TABLET ORAL EVERY 6 HOURS
Refills: 0 | Status: DISCONTINUED | OUTPATIENT
Start: 2019-05-20 | End: 2019-05-21

## 2019-05-20 RX ORDER — LIDOCAINE 4 G/100G
1 CREAM TOPICAL DAILY
Refills: 0 | Status: DISCONTINUED | OUTPATIENT
Start: 2019-05-20 | End: 2019-05-21

## 2019-05-20 RX ADMIN — LIDOCAINE 1 PATCH: 4 CREAM TOPICAL at 17:28

## 2019-05-20 RX ADMIN — OXYCODONE AND ACETAMINOPHEN 2 TABLET(S): 5; 325 TABLET ORAL at 21:00

## 2019-05-20 RX ADMIN — Medication 2000 MILLIGRAM(S): at 21:17

## 2019-05-20 RX ADMIN — HEPARIN SODIUM 5000 UNIT(S): 5000 INJECTION INTRAVENOUS; SUBCUTANEOUS at 21:17

## 2019-05-20 RX ADMIN — FAMOTIDINE 20 MILLIGRAM(S): 10 INJECTION INTRAVENOUS at 21:18

## 2019-05-20 RX ADMIN — OXYCODONE AND ACETAMINOPHEN 2 TABLET(S): 5; 325 TABLET ORAL at 20:17

## 2019-05-20 RX ADMIN — Medication 100 MILLIGRAM(S): at 17:34

## 2019-05-20 RX ADMIN — SENNA PLUS 2 TABLET(S): 8.6 TABLET ORAL at 21:17

## 2019-05-20 NOTE — PROGRESS NOTE ADULT - ASSESSMENT
53 year old male, former smoker with history of SUMAYA (unable to tolerate CPAP), history of VT s/p ICD implantation in 1/2005 (Sprint [place] [id number]) with generator change in 2013.  Patient noticed beeping sound from device in early 5/2019 and discovered device under industry alert and was subsequently referred to Dr. Ventura for surgical evaluation.  He was admitted to [facility] on 5/20/19 and underwent uncomplicated laser lead extraction with implantation of Medtronic PPM/AICD.  He arrived extubated to PACU in stable condition on no drips.      Neurovascular: No delirium, pain well managed on current regimen  -C/w PRNs for Pain control  -Monitor neuro status    Respiratory: Saturates well on room air. History of SUMAYA  -SUMAYA: controlled after patient's weight loss, continue to monitor.   -CXR post-op pending, f/u results.   -Encourage IS 10x/hour while awake, Cough and deep breathing exercises  -Monitor respiratory status via SpO2  -Continue to wean NC as tolerated    Cardiovascular: History above.  POD 0 s/p Laser lead extraction and AICD implantation 2/2 device recall, EF unknown.   -PPM to be interrogated in AM.   -Monitor HR/BP/Tele    GI: Tolerating PO  -Advance diet as tolerated.   -Prophylaxis: Pepcid  -C/w bowel regimen    /Renal: No acute issues.   -BUN/Cr: 14/0.71  -Trend Cr on AM labs  -Replete electrolytes as needed    ID: Afebrile, asymptomatic  -Complete periop ABx.  Per Dr. Ventura, patient to complete 7-day course of augmentin on discharge.   -WCC: 10  -Continue to monitor for SIRS/Sepsis syndrome while inpatient    Endo: No acute issues  -A1C: 5.8    Heme:   -H/H: 13/41  -CBC, chem in AM  -DVT ppx: HSQ 5000 u q8h and SCDs    Disposition: Home when medically appropriate.

## 2019-05-20 NOTE — PROGRESS NOTE ADULT - SUBJECTIVE AND OBJECTIVE BOX
POST-OP RECOVERY NOTE    Operation / Date: 5/20/19: Laser lead extraction and Medtronic AICD implantation on 5/20/19    SUBJECTIVE ASSESSMENT:  53y Male seen at bedside post-operatively.  Awake, alert and interactive, endorses incisional pain, 8/10.  Otherwise Denies HA, AMS, CP, palpitations, SOB, cough, hemoptysis, n/v/d, fever.     Vital Signs Last 24 Hrs  T(C): 36.7 (20 May 2019 16:05), Max: 36.7 (20 May 2019 16:05)  T(F): 98.1 (20 May 2019 16:05), Max: 98.1 (20 May 2019 16:05)  HR: 72 (20 May 2019 16:45) (66 - 78)  BP: 138/87 (20 May 2019 16:45) (131/83 - 140/93)  BP(mean): 108 (20 May 2019 16:20) (102 - 108)  RR: 15 (20 May 2019 16:45) (11 - 15)  SpO2: 99% (20 May 2019 16:45) (96% - 99%)  I&O's Detail    CHEST TUBE:  No.   SHABNAM DRAIN:  No.  EPICARDIAL WIRES: No.  TIE DOWNS: No.  GONZALES: No.     PHYSICAL EXAM:  General: Resting in bed, no acute distress.  Neurological: AAOx3, no AMS or focal deficits.   Cardiovascular: RRR, S1/S2, no m/r/g. +Left anterior chest incision: pressure dressing in place  Respiratory: No acute distress.  CTA b/l, no w/r/r.   Gastrointestinal: ND, NBS, non-TTP.   Extremities: Warm and well perfused, no calf ttp or edema b/l.   Vascular: Pulses 2+ throughout  Incision Sites: Left anterior chest: C/D/I, no hematoma.     LABS:                        13.4   10.20 )-----------( 232      ( 20 May 2019 16:13 )             41.4       COUMADIN: No.    05-20    138  |  105  |  14  ----------------------------<  114<H>  4.1   |  24  |  0.71    Ca    8.6      20 May 2019 16:13    TPro  6.9  /  Alb  3.7  /  TBili  0.3  /  DBili  x   /  AST  13  /  ALT  15  /  AlkPhos  44  05-20      MEDICATIONS  (STANDING):  ceFAZolin  Injectable. 2000 milliGRAM(s) IV Push every 8 hours  docusate sodium 100 milliGRAM(s) Oral two times a day  famotidine    Tablet 20 milliGRAM(s) Oral two times a day  heparin  Injectable 5000 Unit(s) SubCutaneous every 8 hours  lactated ringers. 500 milliLiter(s) (75 mL/Hr) IV Continuous <Continuous>  lidocaine   Patch 1 Patch Transdermal daily  morphine  - Injectable 2 milliGRAM(s) IV Push once  senna 2 Tablet(s) Oral at bedtime    MEDICATIONS  (PRN):      RADIOLOGY & ADDITIONAL TESTS:  CXR: Pending

## 2019-05-20 NOTE — BRIEF OPERATIVE NOTE - NSICDXBRIEFPROCEDURE_GEN_ALL_CORE_FT
PROCEDURES:  AICD implantation 20-May-2019 15:50:43 and new RV lead implantation, Medtronic system Kt Engel  Laser AICD lead extraction 20-May-2019 15:50:03  Kt Engel

## 2019-05-21 ENCOUNTER — TRANSCRIPTION ENCOUNTER (OUTPATIENT)
Age: 54
End: 2019-05-21

## 2019-05-21 VITALS — TEMPERATURE: 98 F

## 2019-05-21 PROBLEM — Z86.79 PERSONAL HISTORY OF OTHER DISEASES OF THE CIRCULATORY SYSTEM: Chronic | Status: ACTIVE | Noted: 2019-05-16

## 2019-05-21 PROBLEM — T82.110A BREAKDOWN (MECHANICAL) OF CARDIAC ELECTRODE, INITIAL ENCOUNTER: Chronic | Status: ACTIVE | Noted: 2019-05-16

## 2019-05-21 PROBLEM — G47.33 OBSTRUCTIVE SLEEP APNEA (ADULT) (PEDIATRIC): Chronic | Status: ACTIVE | Noted: 2019-05-16

## 2019-05-21 PROBLEM — Z95.810 PRESENCE OF AUTOMATIC (IMPLANTABLE) CARDIAC DEFIBRILLATOR: Chronic | Status: ACTIVE | Noted: 2019-05-16

## 2019-05-21 LAB
ANION GAP SERPL CALC-SCNC: 13 MMOL/L — SIGNIFICANT CHANGE UP (ref 5–17)
BUN SERPL-MCNC: 16 MG/DL — SIGNIFICANT CHANGE UP (ref 7–23)
CALCIUM SERPL-MCNC: 9 MG/DL — SIGNIFICANT CHANGE UP (ref 8.4–10.5)
CHLORIDE SERPL-SCNC: 105 MMOL/L — SIGNIFICANT CHANGE UP (ref 96–108)
CO2 SERPL-SCNC: 21 MMOL/L — LOW (ref 22–31)
CREAT SERPL-MCNC: 0.77 MG/DL — SIGNIFICANT CHANGE UP (ref 0.5–1.3)
GLUCOSE SERPL-MCNC: 153 MG/DL — HIGH (ref 70–99)
HCT VFR BLD CALC: 41 % — SIGNIFICANT CHANGE UP (ref 39–50)
HGB BLD-MCNC: 13.4 G/DL — SIGNIFICANT CHANGE UP (ref 13–17)
MAGNESIUM SERPL-MCNC: 1.8 MG/DL — SIGNIFICANT CHANGE UP (ref 1.6–2.6)
MCHC RBC-ENTMCNC: 28.3 PG — SIGNIFICANT CHANGE UP (ref 27–34)
MCHC RBC-ENTMCNC: 32.7 GM/DL — SIGNIFICANT CHANGE UP (ref 32–36)
MCV RBC AUTO: 86.5 FL — SIGNIFICANT CHANGE UP (ref 80–100)
NRBC # BLD: 0 /100 WBCS — SIGNIFICANT CHANGE UP (ref 0–0)
PLATELET # BLD AUTO: 269 K/UL — SIGNIFICANT CHANGE UP (ref 150–400)
POTASSIUM SERPL-MCNC: 4.4 MMOL/L — SIGNIFICANT CHANGE UP (ref 3.5–5.3)
POTASSIUM SERPL-SCNC: 4.4 MMOL/L — SIGNIFICANT CHANGE UP (ref 3.5–5.3)
RBC # BLD: 4.74 M/UL — SIGNIFICANT CHANGE UP (ref 4.2–5.8)
RBC # FLD: 12.1 % — SIGNIFICANT CHANGE UP (ref 10.3–14.5)
SODIUM SERPL-SCNC: 139 MMOL/L — SIGNIFICANT CHANGE UP (ref 135–145)
WBC # BLD: 13.53 K/UL — HIGH (ref 3.8–10.5)
WBC # FLD AUTO: 13.53 K/UL — HIGH (ref 3.8–10.5)

## 2019-05-21 PROCEDURE — 71045 X-RAY EXAM CHEST 1 VIEW: CPT | Mod: 26

## 2019-05-21 RX ORDER — ESZOPICLONE 2 MG/1
1 TABLET, COATED ORAL
Qty: 10 | Refills: 0
Start: 2019-05-21

## 2019-05-21 RX ORDER — ALPRAZOLAM 0.25 MG
1 TABLET ORAL
Qty: 0 | Refills: 0 | DISCHARGE

## 2019-05-21 RX ORDER — ESZOPICLONE 2 MG/1
1 TABLET, COATED ORAL
Qty: 0 | Refills: 0 | DISCHARGE

## 2019-05-21 RX ORDER — ATENOLOL 25 MG/1
25 TABLET ORAL DAILY
Refills: 0 | Status: DISCONTINUED | OUTPATIENT
Start: 2019-05-21 | End: 2019-05-21

## 2019-05-21 RX ORDER — DOCUSATE SODIUM 100 MG
1 CAPSULE ORAL
Qty: 30 | Refills: 0
Start: 2019-05-21

## 2019-05-21 RX ORDER — ATENOLOL 25 MG/1
1 TABLET ORAL
Qty: 0 | Refills: 0 | DISCHARGE

## 2019-05-21 RX ORDER — ALPRAZOLAM 0.25 MG
1 TABLET ORAL
Qty: 10 | Refills: 0
Start: 2019-05-21

## 2019-05-21 RX ORDER — MAGNESIUM OXIDE 400 MG ORAL TABLET 241.3 MG
800 TABLET ORAL ONCE
Refills: 0 | Status: COMPLETED | OUTPATIENT
Start: 2019-05-21 | End: 2019-05-21

## 2019-05-21 RX ORDER — ACETAMINOPHEN 500 MG
2 TABLET ORAL
Qty: 30 | Refills: 0
Start: 2019-05-21

## 2019-05-21 RX ADMIN — Medication 2000 MILLIGRAM(S): at 06:04

## 2019-05-21 RX ADMIN — Medication 2000 MILLIGRAM(S): at 13:21

## 2019-05-21 RX ADMIN — HEPARIN SODIUM 5000 UNIT(S): 5000 INJECTION INTRAVENOUS; SUBCUTANEOUS at 06:04

## 2019-05-21 RX ADMIN — MAGNESIUM OXIDE 400 MG ORAL TABLET 800 MILLIGRAM(S): 241.3 TABLET ORAL at 08:38

## 2019-05-21 RX ADMIN — LIDOCAINE 1 PATCH: 4 CREAM TOPICAL at 06:05

## 2019-05-21 RX ADMIN — ATENOLOL 25 MILLIGRAM(S): 25 TABLET ORAL at 09:03

## 2019-05-21 RX ADMIN — LIDOCAINE 1 PATCH: 4 CREAM TOPICAL at 08:38

## 2019-05-21 RX ADMIN — OXYCODONE AND ACETAMINOPHEN 2 TABLET(S): 5; 325 TABLET ORAL at 03:00

## 2019-05-21 RX ADMIN — Medication 100 MILLIGRAM(S): at 06:05

## 2019-05-21 RX ADMIN — OXYCODONE AND ACETAMINOPHEN 2 TABLET(S): 5; 325 TABLET ORAL at 02:18

## 2019-05-21 RX ADMIN — FAMOTIDINE 20 MILLIGRAM(S): 10 INJECTION INTRAVENOUS at 06:05

## 2019-05-21 NOTE — DISCHARGE NOTE PROVIDER - CARE PROVIDER_API CALL
Angel Ventura)  Cardiovascular Surgery  [address], [address] [place] [address]  Phone: [phone]  Fax: [phone]  Follow Up Time:     Parish Long)  Cardiac Electrophysiology; Cardiovascular Disease  [address], [address]  [place] [address]  Phone: [phone]  Fax: [phone]  Follow Up Time:

## 2019-05-21 NOTE — DISCHARGE NOTE NURSING/CASE MANAGEMENT/SOCIAL WORK - NSDCDPATPORTLINK_GEN_ALL_CORE
You can access the [facility] Patient Portal, offered by [facility], by registering with the following website: [email or web address]

## 2019-05-21 NOTE — DISCHARGE NOTE PROVIDER - HOSPITAL COURSE
This is a 53 year old male, former smoker with history of SUMAYA (unable to tolerate CPAP), history of VT s/p ICD implantation in 1/2005 (Sprint [place] 6949) with generator change in 2013.  Patient noticed beeping sound from device and discovered device under industry alert and was subsequently referred to Dr. Ventura for surgical evaluation.  He was admitted to [facility] on 5/20/19 and underwent uncomplicated laser lead extraction with implantation of Medtronic PPM/AICD.  He arrived extubated to PACU in stable condition on no drips and was transferred to floor care that evening without acute issues overnight.  On 5/21/19, POD 1, PPM interrogated in AM by Dr. Long, functional.  He was evaluated by Dr. Long and Dr. Day and medically cleared for discharge to home today with plan for outpatient follow-up.  Over 30 minutes was spent with the patient reviewing the discharge material including medications, follow up appointments, recovery, concerning symptoms, and how to contact their health care providers if they have questions. This is a 53 year old male, former smoker with history of SUMAYA (unable to tolerate CPAP), history of VT s/p ICD implantation in 1/2005 (Sprint [employer] 6949) with generator change in 2013.  Patient noticed beeping sound from device and discovered device under industry alert and was subsequently referred to Dr. Ventura for surgical evaluation.  He was admitted to [facility] on 5/20/19 and underwent uncomplicated laser lead extraction with implantation of Medtronic PPM/AICD.  He arrived extubated to PACU in stable condition on no drips and was transferred to floor care that evening without acute issues overnight.  On 5/21/19, POD 1, PPM interrogated in AM by Dr. Long, functional.  He was evaluated by Dr. Long and Dr. Day/Audrey and medically cleared for discharge to home today with plan for outpatient follow-up.  Over 30 minutes was spent with the patient reviewing the discharge material including medications, follow up appointments, recovery, concerning symptoms, and how to contact their health care providers if they have questions.

## 2019-05-21 NOTE — PROGRESS NOTE ADULT - SUBJECTIVE AND OBJECTIVE BOX
Patient discussed on morning rounds with Dr. Day    Operation / Date: Laser lead extraction and implantation of new RV lead and Medtronic PPM/AICD on 5/20/19    Surgeon: Dr. Ventura    Referring Physician: Dr. Long     SUBJECTIVE ASSESSMENT:  53y Male seen at bedside this AM. Doing very well, pain is well controlled and no issues overnight.  Denies HA, AMS, CP, palpitations, SOB, cough, hemoptysis, n/v/d, fever. Feels ready to go home.     Hospital Course:  This is a 53 year old male, former smoker with history of SUMAYA (unable to tolerate CPAP), history of VT s/p ICD implantation in 1/2005 (Sprint [place] 6949) with generator change in 2013.  Patient noticed beeping sound from device and discovered device under industry alert and was subsequently referred to Dr. Ventura for surgical evaluation.  He was admitted to [facility] on 5/20/19 and underwent uncomplicated laser lead extraction with implantation of Medtronic PPM/AICD.  He arrived extubated to PACU in stable condition on no drips and was transferred to floor care that evening without acute issues overnight.  On 5/21/19, POD 1, PPM interrogated in AM by Dr. Long, functional.  He was evaluated by Dr. Long and Dr. Day and medically cleared for discharge to home today with plan for outpatient follow-up.  Over 30 minutes was spent with the patient reviewing the discharge material including medications, follow up appointments, recovery, concerning symptoms, and how to contact their health care providers if they have questions.     Vital Signs Last 24 Hrs  T(C): 36.9 (21 May 2019 09:13), Max: 36.9 (21 May 2019 09:13)  T(F): 98.4 (21 May 2019 09:13), Max: 98.4 (21 May 2019 09:13)  HR: 72 (21 May 2019 08:45) (66 - 80)  BP: 137/90 (21 May 2019 08:45) (115/59 - 155/82)  BP(mean): 123 (21 May 2019 08:45) (78 - 123)  RR: 15 (21 May 2019 08:45) (11 - 20)  SpO2: 96% (21 May 2019 08:45) (95% - 99%)  I&O's Detail    20 May 2019 07:01  -  21 May 2019 07:00  --------------------------------------------------------  IN:    lactated ringers.: 250 mL    Oral Fluid: 600 mL  Total IN: 850 mL    OUT:    Voided: 1050 mL  Total OUT: 1050 mL    Total NET: -200 mL      21 May 2019 07:01  -  21 May 2019 09:56  --------------------------------------------------------  IN:    Oral Fluid: 600 mL  Total IN: 600 mL    OUT:  Total OUT: 0 mL    Total NET: 600 mL    EPICARDIAL WIRES REMOVED: NA.  TIE DOWNS REMOVED: NA    PHYSICAL EXAM:  General: Sitting up in bed, no acute distress.   Neurological: AAOx3, no AMS or focal deficits.   Cardiovascular: RRR, S1/S2, no m/r/g.   Respiratory: No acute distress, CTA b/l, no w/r/r.   Gastrointestinal: ND, NBS, non-TTP.  Extremities: Warm and well perfused, no calf ttp b/l.  No edema b/l.   Vascular: Pulses 2+ throughout.   Incision sites: Left anterior incision: Small hematoma, non-TTP.  Pressure dressing in place.     LABS:                        13.4   13.53 )-----------( 269      ( 21 May 2019 05:53 )             41.0       COUMADIN:  No.     05-21    139  |  105  |  16  ----------------------------<  153<H>  4.4   |  21<L>  |  0.77    Ca    9.0      21 May 2019 05:53  Mg     1.8     05-21    TPro  6.9  /  Alb  3.7  /  TBili  0.3  /  DBili  x   /  AST  13  /  ALT  15  /  AlkPhos  44  05-20      MEDICATIONS  (STANDING):  ATENolol  Tablet 25 milliGRAM(s) Oral daily  ceFAZolin  Injectable. 2000 milliGRAM(s) IV Push every 8 hours  docusate sodium 100 milliGRAM(s) Oral two times a day  famotidine    Tablet 20 milliGRAM(s) Oral two times a day  heparin  Injectable 5000 Unit(s) SubCutaneous every 8 hours  lidocaine   Patch 1 Patch Transdermal daily  senna 2 Tablet(s) Oral at bedtime      Discharge CXR:  < from: Xray Chest PA/Lat Pre Surgical Testing 2 Views (05.14.19 @ 14:02) >  EXAM:  XR CHEST PA LAT PST 2V                          PROCEDURE DATE:  05/14/2019          INTERPRETATION:  XR CHEST PA LAT PST 2V dated 5/14/2019 2:02 PM    CLINICAL INFORMATION: Male, 53 years old.  preop.    PRIOR STUDIES: None.    FINDINGS: The heart size, mediastinal and hilar contours are within   normal limits. There is a left-sided single lead right ventricular ICD   permanent pacemaker. There may be patchy opacities over the left lower   lung zone versus compositional shadow and/or linear atelectasis. A   follow-up 2 view chest may be of value for more complete evaluation.    IMPRESSION:  Left-sided permanent pacemaker with a single lead right ventricular ICD.  Query left lower lobe pneumonia and/or linear atelectasis. A follow-up2   view chest is suggested for confirmation and to confirm resolution.    < end of copied text >

## 2019-05-21 NOTE — DISCHARGE NOTE PROVIDER - NSDCFUADDINST_GEN_ALL_CORE_FT
-Walk daily as tolerated and use your incentive spirometer every hour.    -No driving or strenuous activity/exercise for 6 weeks, or until  cleared by your surgeon.    -Gently clean your incisions with anti-bacterial soap and water, pat  dry.  You may leave them open to air.    -Call your doctor if you have shortness of breath, chest pain not  relieved by pain medication, dizziness, fever >101.5, or increased  redness or drainage from incisions. -Walk daily as tolerated and use your incentive spirometer every hour.    -No driving or strenuous activity/exercise for 4- 6 weeks, or until  cleared by your surgeon.    -Gently clean your incisions with anti-bacterial soap and water, pat  dry.  You may leave them open to air.  Please keep your pressure dressing on for 2 days  You may remove the plastic dressing on Saturday but keep the steri strips in place  The steri strips will fall off over time     -Call your doctor if you have shortness of breath, chest pain not  relieved by pain medication, dizziness, fever >101.5, or increased  redness or drainage from incisions.    Please keep the pressure dressing dry until removed then you may take regular showers.

## 2019-05-21 NOTE — DISCHARGE NOTE PROVIDER - CARE PROVIDERS DIRECT ADDRESSES
[email or web address]

## 2019-05-21 NOTE — DISCHARGE NOTE NURSING/CASE MANAGEMENT/SOCIAL WORK - NSDCFUADDAPPT_GEN_ALL_CORE_FT
Dr. Ventura: 5/30 at 2PM     Dr. Long: 6/18/19 at 10:20am    Office locations and numbers are listed in this discharge

## 2019-05-21 NOTE — DISCHARGE NOTE PROVIDER - NSDCFUADDAPPT_GEN_ALL_CORE_FT
Dr. Ventura:     Dr. Long: 6/18/19 at 10:20am. Dr. Ventura: 5/30 at 2PM     Dr. Long: 6/18/19 at 10:20am    Office locations and numbers are listed in this discharge

## 2019-05-21 NOTE — PROGRESS NOTE ADULT - SUBJECTIVE AND OBJECTIVE BOX
EPS Device interrogation    Indication: post implant    Device model: MDT Dori MRI 			Implanting Physician: /     Functioning Mode: VVI			    Underlying Rhythm: VS    Pacemaker dependency:  No    Battery status: 100% (YENNY)  Interrogating parameters:   				RA			RV			LV    Sense:                                                                             8.6    mV    Threshold:                                                                    0.75 V@ 0.4 ms    Pacing Impedance:                                                                 456  om    Shock Impedance:                                                                    53 om    Events/Alert:  none    Parameter change: 	none    For ICD only:  tachy therapy – unchanged   Normal function ICD  sit of implant dry/clean, no bleeding,  + tenderness, minimal hematoma  F/u Dr. Long  6/18/19 @ 10:20 am   [ ]EPS attending: Interrogation reviewed. Agree with above.

## 2019-05-22 RX ORDER — ATENOLOL 25 MG/1
1 TABLET ORAL
Qty: 30 | Refills: 0
Start: 2019-05-22

## 2019-05-23 PROCEDURE — 86850 RBC ANTIBODY SCREEN: CPT

## 2019-05-23 PROCEDURE — C1777: CPT

## 2019-05-23 PROCEDURE — C1889: CPT

## 2019-05-23 PROCEDURE — 36415 COLL VENOUS BLD VENIPUNCTURE: CPT

## 2019-05-23 PROCEDURE — C1769: CPT

## 2019-05-23 PROCEDURE — 85027 COMPLETE CBC AUTOMATED: CPT

## 2019-05-23 PROCEDURE — C1722: CPT

## 2019-05-23 PROCEDURE — 80053 COMPREHEN METABOLIC PANEL: CPT

## 2019-05-23 PROCEDURE — 80048 BASIC METABOLIC PNL TOTAL CA: CPT

## 2019-05-23 PROCEDURE — 86923 COMPATIBILITY TEST ELECTRIC: CPT

## 2019-05-23 PROCEDURE — 93005 ELECTROCARDIOGRAM TRACING: CPT

## 2019-05-23 PROCEDURE — C2629: CPT

## 2019-05-23 PROCEDURE — 76000 FLUOROSCOPY <1 HR PHYS/QHP: CPT

## 2019-05-23 PROCEDURE — 85025 COMPLETE CBC W/AUTO DIFF WBC: CPT

## 2019-05-23 PROCEDURE — 71045 X-RAY EXAM CHEST 1 VIEW: CPT

## 2019-05-23 PROCEDURE — 83735 ASSAY OF MAGNESIUM: CPT

## 2019-05-23 PROCEDURE — 88304 TISSUE EXAM BY PATHOLOGIST: CPT

## 2019-05-23 PROCEDURE — 86900 BLOOD TYPING SEROLOGIC ABO: CPT

## 2019-05-23 PROCEDURE — 86901 BLOOD TYPING SEROLOGIC RH(D): CPT

## 2019-05-23 PROCEDURE — C1773: CPT

## 2019-05-24 LAB — SURGICAL PATHOLOGY STUDY: SIGNIFICANT CHANGE UP

## 2019-05-30 ENCOUNTER — APPOINTMENT (OUTPATIENT)
Dept: CARDIOTHORACIC SURGERY | Facility: CLINIC | Age: 54
End: 2019-05-30
Payer: COMMERCIAL

## 2019-05-30 ENCOUNTER — APPOINTMENT (OUTPATIENT)
Dept: HEART AND VASCULAR | Facility: CLINIC | Age: 54
End: 2019-05-30
Payer: COMMERCIAL

## 2019-05-30 VITALS
WEIGHT: 225 LBS | DIASTOLIC BLOOD PRESSURE: 79 MMHG | BODY MASS INDEX: 33.33 KG/M2 | SYSTOLIC BLOOD PRESSURE: 124 MMHG | HEIGHT: 69 IN | HEART RATE: 57 BPM

## 2019-05-30 VITALS
OXYGEN SATURATION: 96 % | HEART RATE: 59 BPM | WEIGHT: 225 LBS | RESPIRATION RATE: 16 BRPM | DIASTOLIC BLOOD PRESSURE: 83 MMHG | HEIGHT: 69 IN | BODY MASS INDEX: 33.33 KG/M2 | SYSTOLIC BLOOD PRESSURE: 133 MMHG | TEMPERATURE: 98.2 F

## 2019-05-30 DIAGNOSIS — Z09 ENCOUNTER FOR FOLLOW-UP EXAMINATION AFTER COMPLETED TREATMENT FOR CONDITIONS OTHER THAN MALIGNANT NEOPLASM: ICD-10-CM

## 2019-05-30 PROCEDURE — 99024 POSTOP FOLLOW-UP VISIT: CPT

## 2019-05-30 PROCEDURE — 93282 PRGRMG EVAL IMPLANTABLE DFB: CPT

## 2019-05-31 PROBLEM — Z09 POSTOP CHECK: Status: ACTIVE | Noted: 2019-05-30
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
## 2019-06-05 DIAGNOSIS — G47.33 OBSTRUCTIVE SLEEP APNEA (ADULT) (PEDIATRIC): ICD-10-CM

## 2019-06-05 DIAGNOSIS — T82.110A BREAKDOWN (MECHANICAL) OF CARDIAC ELECTRODE, INITIAL ENCOUNTER: ICD-10-CM

## 2019-06-05 DIAGNOSIS — T82.118A BREAKDOWN (MECHANICAL) OF OTHER CARDIAC ELECTRONIC DEVICE, INITIAL ENCOUNTER: ICD-10-CM

## 2019-07-12 NOTE — HISTORY OF PRESENT ILLNESS
[None] : The patient complains of no symptoms [de-identified] : This is a 54 yo man with a history of VT S/P ICD implant with ICD generator change 2/21/13.  He had a Medtronic [place] lead implanted with subsequent device interrogation significant for noise detected on the lead after he noted "beeping" from the device.  He is s/p laser assisted extraction by Dr. Ventura and re-implantation of an MRI compatible system on 5/20/19.  He presents for post procedure follow-up and offers no acute complaints.  \par \par Occasionally aware of "PVC's" that he describes as a skipping in the chest.

## 2019-07-12 NOTE — PROCEDURE
[No] : not [NSR] : normal sinus rhythm [Medtronic] : Medtronic [ICD] : Implantable cardioverter-defibrillator [VVI] : VVI [Lead Imp:  ___ohms] : lead impedance was [unfilled] ohms [Voltage: ___ volts] : Voltage was [unfilled] volts [___V @] : [unfilled] V [Sensing Amplitude ___mv] : sensing amplitude was [unfilled] mv [___ ms] : [unfilled] ms [None] : none [Sense ___ %] : Sense [unfilled]% [de-identified] : REED [de-identified] : POOJA AF MRI [de-identified] : 5/20/19 [de-identified] : 40 [de-identified] : No events

## 2019-07-12 NOTE — PHYSICAL EXAM
[General Appearance - Well Developed] : well developed [Normal Appearance] : normal appearance [General Appearance - Well Nourished] : well nourished [Well Groomed] : well groomed [No Deformities] : no deformities [General Appearance - In No Acute Distress] : no acute distress [Left Infraclavicular] : left infraclavicular area [Dry] : dry [Clean] : clean [Healing Well] : healing well [Palpable Crepitus] : no palpable crepitus [Bleeding] : no active bleeding [Foul Odor] : no foul smell [Purulent Drainage] : no purulent drainage [Serosanguineous Drainage] : no serosanquineous drainage [Serous Drainage] : no serous drainage [Erythema] : not erythematous [Warm] : not warm [Tender] : not tender [Indurated] : not indurated [Fluctuant] : not fluctuant

## 2019-08-06 ENCOUNTER — APPOINTMENT (OUTPATIENT)
Dept: PULMONOLOGY | Facility: CLINIC | Age: 54
End: 2019-08-06

## 2020-02-25 ENCOUNTER — APPOINTMENT (OUTPATIENT)
Dept: HEART AND VASCULAR | Facility: CLINIC | Age: 55
End: 2020-02-25
Payer: COMMERCIAL

## 2020-02-25 VITALS
BODY MASS INDEX: 34.36 KG/M2 | HEIGHT: 69 IN | DIASTOLIC BLOOD PRESSURE: 79 MMHG | HEART RATE: 69 BPM | WEIGHT: 232 LBS | SYSTOLIC BLOOD PRESSURE: 131 MMHG

## 2020-02-25 PROCEDURE — 93282 PRGRMG EVAL IMPLANTABLE DFB: CPT

## 2020-02-27 NOTE — HISTORY OF PRESENT ILLNESS
[None] : The patient complains of no symptoms [de-identified] : This is a 53 yo man with a history of VT S/P ICD implant with ICD generator change 2/21/13.  He had a Medtronic [place] lead implanted with subsequent device interrogation significant for noise detected on the lead after he noted "beeping" from the device.  He is s/p laser assisted extraction by Dr. Ventura and re-implantation of an MRI compatible system on 5/20/19.  He presents for routine follow-up and offers no acute complaints. \par \par Occasionally aware of "PVC's" that he describes as a skipping in the chest.

## 2020-02-27 NOTE — PROCEDURE
[No] : not [NSR] : normal sinus rhythm [ICD] : Implantable cardioverter-defibrillator [Medtronic] : Medtronic [VVI] : VVI [Lead Imp:  ___ohms] : lead impedance was [unfilled] ohms [Voltage: ___ volts] : Voltage was [unfilled] volts [___V @] : [unfilled] V [Sensing Amplitude ___mv] : sensing amplitude was [unfilled] mv [Sense ___ %] : Sense [unfilled]% [___ ms] : [unfilled] ms [None] : none [de-identified] : REED [de-identified] : POOJA AF MRI [de-identified] : 5/20/19 [de-identified] : 40 [de-identified] : 1 Brief NSVT event 6 beats

## 2020-02-27 NOTE — PHYSICAL EXAM
[Normal Appearance] : normal appearance [General Appearance - Well Developed] : well developed [Well Groomed] : well groomed [General Appearance - Well Nourished] : well nourished [No Deformities] : no deformities [General Appearance - In No Acute Distress] : no acute distress [Left Infraclavicular] : left infraclavicular area [Clean] : clean [Dry] : dry [Palpable Crepitus] : no palpable crepitus [Well-Healed] : well-healed [Bleeding] : no active bleeding [Foul Odor] : no foul smell [Purulent Drainage] : no purulent drainage [Serosanguineous Drainage] : no serosanquineous drainage [Serous Drainage] : no serous drainage [Erythema] : not erythematous [Warm] : not warm [Indurated] : not indurated [Tender] : not tender [Fluctuant] : not fluctuant

## 2020-09-09 ENCOUNTER — APPOINTMENT (OUTPATIENT)
Dept: PULMONOLOGY | Facility: CLINIC | Age: 55
End: 2020-09-09
Payer: COMMERCIAL

## 2020-09-09 VITALS
RESPIRATION RATE: 16 BRPM | OXYGEN SATURATION: 98 % | BODY MASS INDEX: 35.77 KG/M2 | SYSTOLIC BLOOD PRESSURE: 126 MMHG | WEIGHT: 236 LBS | TEMPERATURE: 97.3 F | HEART RATE: 66 BPM | DIASTOLIC BLOOD PRESSURE: 78 MMHG | HEIGHT: 68 IN

## 2020-09-09 PROCEDURE — 99214 OFFICE O/P EST MOD 30 MIN: CPT | Mod: 25

## 2020-09-09 PROCEDURE — 95012 NITRIC OXIDE EXP GAS DETER: CPT

## 2020-09-09 PROCEDURE — 99406 BEHAV CHNG SMOKING 3-10 MIN: CPT

## 2020-09-09 PROCEDURE — 94618 PULMONARY STRESS TESTING: CPT

## 2020-09-09 PROCEDURE — 71046 X-RAY EXAM CHEST 2 VIEWS: CPT

## 2020-09-09 RX ORDER — ATENOLOL 50 MG/1
50 TABLET ORAL DAILY
Qty: 90 | Refills: 3 | Status: ACTIVE | COMMUNITY
Start: 2020-09-09 | End: 1900-01-01

## 2020-09-09 NOTE — ASSESSMENT
[FreeTextEntry1] : Mr. Castellon is 54 year old Male who has a history of COPD, asthma, insomnia, OSAS allergies and sinus. Number one issue is still  his sleep and weight. \par \par problem 1: COPD/asthma (stable) \par -continue Ventolin 2 inhalations QHS\par -continue Trelegy 1 inhalation QD\par \par -COPD is a progressive disease and although it can’t be cured , appropriate management can slow its progression, reduce frequency and severity of exacerbations, and improve symptoms and the patient quality of life. Hospitalizations are the greatest contributor to the total COPD costs and account for up to 87% of total COPD related costs. Exacerbations are the main cause of admissions and subsequently account for the 40-75% of COPD costs. Inhaled maintenance therapy reduces the incidence of exacerbations in patients with stable COPD. Incorrect inhaler use and nonadherence are major obstacles to achieving COPD treatment goals. Many COPD patients have challenges (impaired inhalation, limited dexterity, reduced cognition: that limit their ability to correctly use their COPD treatment devices resulting in reduced symptom control. Of most importance is smoking cessation and early intervention with respiratory illnesses and contemplation for pulmonary rehab to enhance quality of life.\par -Asthma is  believed to be caused by inherited (genetic) and environmental factor, but its exact cause is unknown. Asthma may be triggered by allergens, lung infections, or irritants in the air. Asthma triggers are different for each person\par \par Problem 1A: cardiac \par -Dr. Long\par \par problem 2: Nicotine Addiction (9/9/2020) \par -Discussed for five minutes with the patient the risks/associations with continued smoking including COPD, emphysema, shortness of breath, renal cancer, bladder cancer, stroke risk, cardiac disease, etc. Smoking cessation was discussed at length and highly encouraged. Various options to aid cessation was discussed including use of Chantix, Nicotrol, nicotine products, laser therapy, hypnosis, Wellbutrin, etc.\par \par problem 3 : allergic rhinitis\par -continue to use nasal saline\par -can continue Zyrtec 10 mg QHS \par -Flonase 1 sniff BID \par -continue Olopatadine 0.6%1 sniff/nostril BID \par -continue Olopatadine 0.2 one drop/eye  \par \par -Environmental measures for allergies were encouraged including mattress and pillow cover, air purifier, and environmental controls.\par \par problem 5: abnormal chest CT (lung cancer screening CT)\par -follow up chest CT to be done at this time \par \par problem 6: Severe OSAS\par -he is awaiting his oral appliance  (insurance approval) - Danoff \par -he is unable to tolerate CPAP\par -recommended Insomnitol\par \par -Sleep apnea is associated with adverse clinical consequences which an affect most organ systems.  Cardiovascular disease risk includes arrhythmias, atrial fibrillation, hypertension, coronary artery disease, and stroke. Metabolic disorders include diabetes type 2, non-alcoholic fatty liver disease. Mood disorder especially depression; and cognitive decline especially in the elderly. Associations with  chronic reflux/Gamez’s esophagus some but not all inclusive. \par -Reasons to assess this include arousal consistent with hypopnea; respiratory events most prominent in REM sleep or supine position; therefore sleep staging and body position are important for accurate diagnosis and estimation of AHI.\par \par problem 7: overweight\par -Weight loss, exercise, and diet control were discussed and are highly encouraged. Treatment options were given such as, aqua therapy, and contacting a nutritionist. Recommended to use the elliptical, stationary bike, less use of treadmill.  Obesity is associated with worsening asthma, shortness of breath, and potential for cardiac disease, diabetes, and other underlying medical conditions. \par \par problem 8: GERD\par -diet controlled \par - -Things to avoid including overeating, spicy foods, tight clothing, eating within three hours of bed, this list is not all inclusive. \par -For treatment of reflux, possible options discussed including diet control, H2 blockers, PPIs, as well as coating motility agents discussed as treatment options. Timing of meals and proximity of last meal to sleep were discussed. If symptoms persist, a formal gastrointestinal evaluation is needed.\par \par F/U in 4 months\par He is encouraged to call with any changes, concerns, or questions.

## 2020-09-09 NOTE — HISTORY OF PRESENT ILLNESS
[FreeTextEntry1] : Mr. Castellon is a 54 year old male presenting to the office today for a follow up visit for abnormal chest CT, asthma, allergies, COPD, GERD, and OSAS. His chief complaint is sleep. \par -a year ago in May he got his defibrillator was changed at [facility]. \par -he notes that the recalled wire failed after 10 years and he had that removed. \par -he states that he just started to workout again this week. \par -he reports that he is sometime tired. \par -he is wheezing once in a while. \par -he states that a couple of years ago he was tested for SUMAYA and that study revealed sleep apnea. \par -he states that he sleeps with his mouth open so the CPAP device does not work on them. \par -Sense of Taste is good. \par -Sense of Smell is good. \par -sinuses are normal. \par -he states that he has his normal allergies. \par -takes Zyrtec when his sneezing becomes active. \par -denies SOB. \par -he states that he quit smoking but then started again.\par -he states that he has been smoking organic cigarettes.  \par -has started to exercise by biking and walking.\par \par -He denies any chest pain, chest pressure, diarrhea, constipation, dysphagia, dizziness, sour taste in the mouth, leg swelling, leg pain, itchy eyes, itchy ears.

## 2020-09-09 NOTE — PHYSICAL EXAM
[Normal Appearance] : normal appearance [Well Groomed] : well groomed [General Appearance - Well Developed] : well developed [General Appearance - In No Acute Distress] : no acute distress [General Appearance - Well Nourished] : well nourished [No Deformities] : no deformities [Normal Conjunctiva] : the conjunctiva exhibited no abnormalities [Eyelids - No Xanthelasma] : the eyelids demonstrated no xanthelasmas [Normal Oropharynx] : normal oropharynx [Neck Appearance] : the appearance of the neck was normal [II] : II [Jugular Venous Distention Increased] : there was no jugular-venous distention [Thyroid Diffuse Enlargement] : the thyroid was not enlarged [Neck Cervical Mass (___cm)] : no neck mass was observed [Heart Rate And Rhythm] : heart rate and rhythm were normal [Thyroid Nodule] : there were no palpable thyroid nodules [Heart Sounds] : normal S1 and S2 [Murmurs] : no murmurs present [Respiration, Rhythm And Depth] : normal respiratory rhythm and effort [Abdomen Soft] : soft [Exaggerated Use Of Accessory Muscles For Inspiration] : no accessory muscle use [Auscultation Breath Sounds / Voice Sounds] : lungs were clear to auscultation bilaterally [Abdomen Tenderness] : non-tender [Abdomen Mass (___ Cm)] : no abdominal mass palpated [Nail Clubbing] : no clubbing of the fingernails [Gait - Sufficient For Exercise Testing] : the gait was sufficient for exercise testing [Abnormal Walk] : normal gait [Petechial Hemorrhages (___cm)] : no petechial hemorrhages [Cyanosis, Localized] : no localized cyanosis [Skin Color & Pigmentation] : normal skin color and pigmentation [] : no rash [No Venous Stasis] : no venous stasis [Skin Lesions] : no skin lesions [No Skin Ulcers] : no skin ulcer [No Xanthoma] : no  xanthoma was observed [Sensation] : the sensory exam was normal to light touch and pinprick [No Focal Deficits] : no focal deficits [Deep Tendon Reflexes (DTR)] : deep tendon reflexes were 2+ and symmetric [Oriented To Time, Place, And Person] : oriented to person, place, and time [Impaired Insight] : insight and judgment were intact [Affect] : the affect was normal [FreeTextEntry1] : I:E 1:3; clear

## 2020-09-09 NOTE — PROCEDURE
[FreeTextEntry1] : CXR revealed a normal sized heart; left sided AICD; 4mm nodule noted. \par \par 6 minute walk test reveals a low saturation of 97% with no evidence of dyspnea or fatigue; walked 782.4 meters. \par \par FENO was 13; a normal value being less than 25\par Fractional exhaled nitric oxide (FENO) is regarded as a simple, noninvasive method for assessing eosinophilic airway inflammation. Produced by a variety of cells within the lung, nitric oxide (NO) concentrations are generally low in healthy individuals. However, high concentrations of NO appear to be involved in nonspecific host defense mechanisms and chronic inflammatory diseases such as asthma. The American Thoracic Society (ATS) therefore has recommended using FENO to aid in the diagnosis and monitoring of eosinophilic airway inflammation and asthma, and for identifying steroid responsive individuals whose chronic respiratory symptoms may be caused by airway inflammation.

## 2020-09-18 ENCOUNTER — OUTPATIENT (OUTPATIENT)
Dept: OUTPATIENT SERVICES | Facility: HOSPITAL | Age: 55
LOS: 1 days | End: 2020-09-18
Payer: COMMERCIAL

## 2020-09-18 ENCOUNTER — APPOINTMENT (OUTPATIENT)
Dept: CT IMAGING | Facility: CLINIC | Age: 55
End: 2020-09-18
Payer: COMMERCIAL

## 2020-09-18 DIAGNOSIS — Z98.890 OTHER SPECIFIED POSTPROCEDURAL STATES: Chronic | ICD-10-CM

## 2020-09-18 DIAGNOSIS — Z98.52 VASECTOMY STATUS: Chronic | ICD-10-CM

## 2020-09-18 DIAGNOSIS — Z00.8 ENCOUNTER FOR OTHER GENERAL EXAMINATION: ICD-10-CM

## 2020-09-18 DIAGNOSIS — Z95.810 PRESENCE OF AUTOMATIC (IMPLANTABLE) CARDIAC DEFIBRILLATOR: Chronic | ICD-10-CM

## 2020-09-18 PROCEDURE — 71250 CT THORAX DX C-: CPT | Mod: 26

## 2020-09-18 PROCEDURE — 71250 CT THORAX DX C-: CPT

## 2020-09-21 NOTE — PRE-OP CHECKLIST - NOTHING BY MOUTH SINCE
ORI CLARK    Patient Age: 63 year old   Refill request by: Phone.  Caller informed to check with the pharmacy later for their refill.  If problems arise, we will contact patient.  Refill to be: ePrescribed to [facility] pharmacy    Medication requested to be refilled:   atorvastatin (LIPITOR) 20 MG tablet  rivaroxaban (XARELTO) 20 MG Tab    Patient requesting 90 day supply  Routed to provider's clinical pool.   Patient advised it may take up to 72 business hours for refill.          WEIGHT AND HEIGHT:   Wt Readings from Last 1 Encounters:   07/11/20 98 kg (216 lb)     Ht Readings from Last 1 Encounters:   07/11/20 5' 7\" (1.702 m)     BMI Readings from Last 1 Encounters:   07/11/20 33.83 kg/m²       ALLERGIES:  Patient has no known allergies.  Current Outpatient Medications   Medication   • rivaroxaban (XARELTO) 20 MG Tab   • atorvastatin (LIPITOR) 20 MG tablet   • azaTHIOprine (IMURAN) 50 MG tablet   • acetaminophen (TYLENOL) 325 MG tablet   • mesalamine (LIALDA) 1.2 g EC tablet   • budesonide (ENTOCORT EC) 3 MG 24 hr capsule   • POLYETHYLENE GLYCOL 3350 PO     No current facility-administered medications for this visit.      PHARMACY to use: see below           Pharmacy preference(s) on file:   [id number] [place] [id number]  Phone: [phone] Fax: [phone]      CALL BACK INFO: Ok to leave response (including medical information) on answering machine  ROUTING: Patient's physician/staff        PCP: Gina Brown MD         INS: Payor: CLYDE/EMEKA / Plan: [id number] / Product Type: PPO MISC   PATIENT ADDRESS:  [address] [place] [address]  
Fwd to Dr. Brown  No medication protocol available Xarelto  LOV:7/11/2020  Last Fill:8/11/2020  Next Appt:10/10/2020    
19-May-2019 19:00

## 2020-10-06 ENCOUNTER — APPOINTMENT (OUTPATIENT)
Dept: HEART AND VASCULAR | Facility: CLINIC | Age: 55
End: 2020-10-06
Payer: COMMERCIAL

## 2020-10-06 VITALS
HEART RATE: 69 BPM | WEIGHT: 236 LBS | DIASTOLIC BLOOD PRESSURE: 97 MMHG | BODY MASS INDEX: 35.77 KG/M2 | HEIGHT: 68 IN | SYSTOLIC BLOOD PRESSURE: 160 MMHG

## 2020-10-06 DIAGNOSIS — R00.2 PALPITATIONS: ICD-10-CM

## 2020-10-06 PROCEDURE — 93282 PRGRMG EVAL IMPLANTABLE DFB: CPT

## 2020-10-06 NOTE — HISTORY OF PRESENT ILLNESS
[None] : The patient complains of no symptoms [de-identified] : This is a 55 yo man with a history of VT S/P ICD implant with ICD generator change 2/21/13.  He had a Medtronic Reader lead implanted with subsequent device interrogation significant for noise detected on the lead after he noted "beeping" from the device.  He is s/p laser assisted extraction by Dr. Ventura and re-implantation of an MRI compatible system on 5/20/19.  He presents for routine follow-up and offers no acute complaints. \par \par Occasionally aware of "PVC's" that he describes as a skipping in the chest; not bothersome recently.

## 2020-10-06 NOTE — PROCEDURE
[No] : not [NSR] : normal sinus rhythm [ICD] : Implantable cardioverter-defibrillator [Medtronic] : Medtronic [VVI] : VVI [Voltage: ___ volts] : Voltage was [unfilled] volts [Lead Imp:  ___ohms] : lead impedance was [unfilled] ohms [Sensing Amplitude ___mv] : sensing amplitude was [unfilled] mv [___V @] : [unfilled] V [___ ms] : [unfilled] ms [None] : none [Sense ___ %] : Sense [unfilled]% [de-identified] : REED [de-identified] : POOJA AF MRI [de-identified] : 5/20/19 [de-identified] : 40 [de-identified] : 1 Brief NSVT event 6 beats

## 2020-11-18 NOTE — PATIENT PROFILE ADULT - NSPROMUTANXFEARFT_GEN_A_NUR
Subjective:     Lucian Contreras returns today status post AVR. Doing well no cardiac or respiratory complaints    Objective:     /78 (Site: Left Upper Arm, Position: Sitting, Cuff Size: Medium Adult)   Pulse 57   Temp 97.3 °F (36.3 °C) (Temporal)   Resp 16   Ht 5' 8\" (1.727 m)   Wt 172 lb (78 kg)   SpO2 96%   BMI 26.15 kg/m²   Chest: healed no clicks or rubs  CV: RR  Lungs: c to IPPA  Lower extremities: no edema    Medications:     Current Outpatient Medications on File Prior to Visit   Medication Sig Dispense Refill    aspirin 81 MG EC tablet Take 1 tablet by mouth daily 30 tablet 3    amiodarone (CORDARONE) 200 MG tablet Take 1 tablet by mouth 2 times daily 30 tablet 3    lisinopril (PRINIVIL;ZESTRIL) 10 MG tablet Take 1 tablet by mouth daily 30 tablet 3    metoprolol tartrate (LOPRESSOR) 25 MG tablet Take 1 tablet by mouth 2 times daily 60 tablet 0    furosemide (LASIX) 20 MG tablet Take 2 tablets by mouth daily 60 tablet 3    warfarin (COUMADIN) 1 MG tablet Take 2 tablets by mouth daily Target INR 1.5 to 2.5 for 90 days post-op. Take 1 mg today and recheck INR tomorrow morning 60 tablet 2    Calcium Carb-Cholecalciferol (CALCIUM 1000 + D PO) Take by mouth      finasteride (PROSCAR) 5 MG tablet Take 1 tablet by mouth daily 90 tablet 1    alendronate (FOSAMAX) 70 MG tablet Take 1 tablet by mouth every 7 days 12 tablet 1    atorvastatin (LIPITOR) 20 MG tablet Take 1 tablet by mouth daily 90 tablet 1    omeprazole (PRILOSEC) 20 MG delayed release capsule Take 20 mg by mouth daily      Multiple Vitamins-Minerals (THERAPEUTIC MULTIVITAMIN-MINERALS) tablet Take 1 tablet by mouth daily      sulfaSALAzine (AZULFIDINE) 500 MG tablet Take 500 mg by mouth 3 times daily       No current facility-administered medications on file prior to visit. Assessment:     S/P AVR    Plan:     F/U as needed.  INR managed by Coumadin clinic    Tanisha Mackay
/

## 2020-12-22 ENCOUNTER — NON-APPOINTMENT (OUTPATIENT)
Age: 55
End: 2020-12-22

## 2021-01-06 ENCOUNTER — APPOINTMENT (OUTPATIENT)
Dept: PULMONOLOGY | Facility: CLINIC | Age: 56
End: 2021-01-06

## 2021-03-23 ENCOUNTER — APPOINTMENT (OUTPATIENT)
Dept: PULMONOLOGY | Facility: CLINIC | Age: 56
End: 2021-03-23
Payer: COMMERCIAL

## 2021-03-23 VITALS
TEMPERATURE: 97.4 F | BODY MASS INDEX: 33.19 KG/M2 | SYSTOLIC BLOOD PRESSURE: 150 MMHG | RESPIRATION RATE: 16 BRPM | DIASTOLIC BLOOD PRESSURE: 84 MMHG | HEIGHT: 68 IN | OXYGEN SATURATION: 98 % | HEART RATE: 70 BPM | WEIGHT: 219 LBS

## 2021-03-23 DIAGNOSIS — Z72.820 SLEEP DEPRIVATION: ICD-10-CM

## 2021-03-23 DIAGNOSIS — M94.0 CHONDROCOSTAL JUNCTION SYNDROME [TIETZE]: ICD-10-CM

## 2021-03-23 PROCEDURE — 99072 ADDL SUPL MATRL&STAF TM PHE: CPT

## 2021-03-23 PROCEDURE — 99407 BEHAV CHNG SMOKING > 10 MIN: CPT

## 2021-03-23 PROCEDURE — 71046 X-RAY EXAM CHEST 2 VIEWS: CPT

## 2021-03-23 PROCEDURE — 99214 OFFICE O/P EST MOD 30 MIN: CPT | Mod: 25

## 2021-03-23 NOTE — ASSESSMENT
[FreeTextEntry1] : Mr. Castellon is 55 year old Male who has a history of COPD, asthma, insomnia, OSAS allergies and sinus. Number one issue is right chest pain, and still  his sleep and weight. \par \par problem 1: COPD/asthma (stable) \par -continue Ventolin 2 inhalations QHS\par -continue Trelegy 1 inhalation QD\par \par -COPD is a progressive disease and although it can’t be cured , appropriate management can slow its progression, reduce frequency and severity of exacerbations, and improve symptoms and the patient quality of life. Hospitalizations are the greatest contributor to the total COPD costs and account for up to 87% of total COPD related costs. Exacerbations are the main cause of admissions and subsequently account for the 40-75% of COPD costs. Inhaled maintenance therapy reduces the incidence of exacerbations in patients with stable COPD. Incorrect inhaler use and nonadherence are major obstacles to achieving COPD treatment goals. Many COPD patients have challenges (impaired inhalation, limited dexterity, reduced cognition: that limit their ability to correctly use their COPD treatment devices resulting in reduced symptom control. Of most importance is smoking cessation and early intervention with respiratory illnesses and contemplation for pulmonary rehab to enhance quality of life.\par -Asthma is  believed to be caused by inherited (genetic) and environmental factor, but its exact cause is unknown. Asthma may be triggered by allergens, lung infections, or irritants in the air. Asthma triggers are different for each person\par \par Problem 1A: cardiac \par -Dr. Long\par \par Problem 1B: Chest Discomfort - c/w costochondritis\par -Add [employer]\par -Recommended to use Topricin Cream or Australian Dream \par -recommended to use exercises shown in office\par \par problem 2: Nicotine Addiction (03/23/2021) \par -Discussed for five minutes with the patient the risks/associations with continued smoking including COPD, emphysema, shortness of breath, renal cancer, bladder cancer, stroke risk, cardiac disease, etc. Smoking cessation was discussed at length and highly encouraged. Various options to aid cessation was discussed including use of Chantix, Nicotrol, nicotine products, laser therapy, hypnosis, Wellbutrin, etc.\par \par problem 3 : allergic rhinitis\par -continue to use nasal saline\par -can continue Zyrtec 10 mg QHS \par -Flonase 1 sniff BID \par -continue Olopatadine 0.6%1 sniff/nostril BID \par -continue Olopatadine 0.2 one drop/eye  \par \par -Environmental measures for allergies were encouraged including mattress and pillow cover, air purifier, and environmental controls.\par \par problem 5: abnormal chest CT (lung cancer screening CT)\par -follow up chest CT 9/2021\par \par Lung cancer screening is recommended for people between the ages of 55 and 80 with prior 30+ pack year smoking histories. There is irrefutable evidence for realization of lung cancer screening based on two large randomized control trials demonstrating relative reduction in lung cancer mortality for patients undergoing low-dose CT scanning. Risks and benefits reviewed with the patient.\par \par problem 6: Severe OSAS\par -he is awaiting his oral appliance  (insurance approval) - Danoff \par -he is unable to tolerate CPAP\par -recommended Insomnitol\par -Recommended to use Slumberbump, Somnifix, and Oxy-Aid\par \par -Sleep apnea is associated with adverse clinical consequences which an affect most organ systems.  Cardiovascular disease risk includes arrhythmias, atrial fibrillation, hypertension, coronary artery disease, and stroke. Metabolic disorders include diabetes type 2, non-alcoholic fatty liver disease. Mood disorder especially depression; and cognitive decline especially in the elderly. Associations with  chronic reflux/Gamez’s esophagus some but not all inclusive. \par -Reasons to assess this include arousal consistent with hypopnea; respiratory events most prominent in REM sleep or supine position; therefore sleep staging and body position are important for accurate diagnosis and estimation of AHI.\par \par problem 7: overweight (in progress)\par -Weight loss, exercise, and diet control were discussed and are highly encouraged. Treatment options were given such as, aqua therapy, and contacting a nutritionist. Recommended to use the elliptical, stationary bike, less use of treadmill.  Obesity is associated with worsening asthma, shortness of breath, and potential for cardiac disease, diabetes, and other underlying medical conditions. \par \par problem 8: GERD\par -diet controlled \par - -Things to avoid including overeating, spicy foods, tight clothing, eating within three hours of bed, this list is not all inclusive. \par -For treatment of reflux, possible options discussed including diet control, H2 blockers, PPIs, as well as coating motility agents discussed as treatment options. Timing of meals and proximity of last meal to sleep were discussed. If symptoms persist, a formal gastrointestinal evaluation is needed.\par \par Problem 9: health maintenance\par -pt has fear of Covid vaccine\par -s/p influenza vaccine\par -recommended strep pneumonia vaccines after age 65: Prevnar-13 vaccine, followed by Pneumo vaccine 23 one year following\par -recommended early intervention for URIs\par -recommended regular osteoporosis evaluations\par -recommended early dermatological evaluations\par -recommended after the age of 50 to the age of 70, colonoscopy every 5 years \par \par F/U in 4 months\par He is encouraged to call with any changes, concerns, or questions.

## 2021-03-23 NOTE — REVIEW OF SYSTEMS
[Negative] : Endocrine [Recent Wt Loss (___ Lbs)] : ~T recent [unfilled] lb weight loss [Cough] : cough [Chest Discomfort] : chest discomfort [Palpitations] : no palpitations [GERD] : no gerd [Diarrhea] : no diarrhea [Constipation] : no constipation [Dysphagia] : no dysphagia [Dizziness] : no dizziness

## 2021-03-23 NOTE — ADDENDUM
[FreeTextEntry1] : Documented by Angel Blackwood acting as a scribe for Dr. Kt Ac on 03/23/2021.\par \par All medical record entries made by the Scribe were at my, Dr. Kt Ac's, direction and personally dictated by me on 03/23/2021. I have reviewed the chart and agree that the record accurately reflects my personal performance of the history, physical exam, assessment and plan. I have also personally directed, reviewed, and agree with the discharge instructions. \par

## 2021-03-23 NOTE — HISTORY OF PRESENT ILLNESS
[FreeTextEntry1] : Mr. Castellon is a 55 year old male presenting to the office today for a follow up visit for abnormal chest CT, asthma, allergies, COPD, GERD, and OSAS. His chief complaint is \par -he notes he has a dull pain on the right medial part of his chest upon inhalation or even at rest.  It has been present for about 1 month. He adjusts his sleep with the pain, and it doesn’t seem to help the pain. He has been taking Advil for it which helps it\par -he reports he lost 25 pounds just by eating more healthily\par -he states he was very sick in January, and had a negative antibody test\par -he notes he is afraid of the Covid-19 vaccine\par -he states he coughs occasionally\par -he notes his sleep has improved with weight loss, and is taking valerian root, which helps \par -he smokes 10 cigarettes per day\par -he notes he has not been treating his SUMAYA yet\par -he denies any palpitations, chest pain, chest pressure, diarrhea, constipation, dysphagia, dizziness, sour taste in the mouth, leg swelling, leg pain, itchy eyes, itchy ears, heartburn, reflux, myalgias or arthralgias.

## 2021-03-23 NOTE — PROCEDURE
[FreeTextEntry1] : CXR reveals a normal sized heart; left permanent pacemaker, no evidence of infiltrate or effusion\par \par CT Chest (9.18.2020) reveals 1. pulmonary nodule in the right upper lobe is unchanged. 2. the opacity along the minor fissure, likely a pulmonary lymph node, is unchanged.

## 2021-04-19 ENCOUNTER — NON-APPOINTMENT (OUTPATIENT)
Age: 56
End: 2021-04-19

## 2021-04-19 ENCOUNTER — APPOINTMENT (OUTPATIENT)
Dept: HEART AND VASCULAR | Facility: CLINIC | Age: 56
End: 2021-04-19
Payer: COMMERCIAL

## 2021-04-19 PROCEDURE — 93295 DEV INTERROG REMOTE 1/2/MLT: CPT

## 2021-04-19 PROCEDURE — 93296 REM INTERROG EVL PM/IDS: CPT

## 2021-07-22 ENCOUNTER — APPOINTMENT (OUTPATIENT)
Dept: HEART AND VASCULAR | Facility: CLINIC | Age: 56
End: 2021-07-22
Payer: COMMERCIAL

## 2021-07-22 ENCOUNTER — NON-APPOINTMENT (OUTPATIENT)
Age: 56
End: 2021-07-22

## 2021-07-22 PROCEDURE — 93296 REM INTERROG EVL PM/IDS: CPT

## 2021-07-22 PROCEDURE — 93295 DEV INTERROG REMOTE 1/2/MLT: CPT

## 2021-07-26 ENCOUNTER — APPOINTMENT (OUTPATIENT)
Dept: PULMONOLOGY | Facility: CLINIC | Age: 56
End: 2021-07-26

## 2021-09-24 ENCOUNTER — RX RENEWAL (OUTPATIENT)
Age: 56
End: 2021-09-24

## 2021-11-05 ENCOUNTER — APPOINTMENT (OUTPATIENT)
Dept: HEART AND VASCULAR | Facility: CLINIC | Age: 56
End: 2021-11-05
Payer: COMMERCIAL

## 2021-11-05 ENCOUNTER — NON-APPOINTMENT (OUTPATIENT)
Age: 56
End: 2021-11-05

## 2021-11-05 VITALS
BODY MASS INDEX: 33.19 KG/M2 | DIASTOLIC BLOOD PRESSURE: 87 MMHG | HEART RATE: 61 BPM | TEMPERATURE: 97 F | SYSTOLIC BLOOD PRESSURE: 130 MMHG | WEIGHT: 219 LBS | HEIGHT: 68 IN

## 2021-11-05 PROCEDURE — 93282 PRGRMG EVAL IMPLANTABLE DFB: CPT

## 2021-11-05 RX ORDER — DICLOFENAC SODIUM AND MISOPROSTOL 50; 200 MG/1; UG/1
50-0.2 TABLET, DELAYED RELEASE ORAL
Qty: 180 | Refills: 1 | Status: DISCONTINUED | COMMUNITY
Start: 2021-03-23 | End: 2021-11-05

## 2021-11-05 RX ORDER — LOSARTAN POTASSIUM AND HYDROCHLOROTHIAZIDE 12.5; 5 MG/1; MG/1
50-12.5 TABLET ORAL DAILY
Refills: 0 | Status: ACTIVE | COMMUNITY

## 2021-11-05 NOTE — PHYSICAL EXAM
[General Appearance - Well Developed] : well developed [Normal Appearance] : normal appearance [Well Groomed] : well groomed [General Appearance - Well Nourished] : well nourished [No Deformities] : no deformities [General Appearance - In No Acute Distress] : no acute distress [Left Infraclavicular] : left infraclavicular area [Clean] : clean [Dry] : dry [Well-Healed] : well-healed [Palpable Crepitus] : no palpable crepitus [Bleeding] : no active bleeding [Foul Odor] : no foul smell [Serosanguineous Drainage] : no serosanquineous drainage [Purulent Drainage] : no purulent drainage [Serous Drainage] : no serous drainage [Erythema] : not erythematous [Warm] : not warm [Tender] : not tender [Indurated] : not indurated [Fluctuant] : not fluctuant

## 2021-11-05 NOTE — HISTORY OF PRESENT ILLNESS
[None] : The patient complains of no symptoms [de-identified] : This is a 56 yo man with a history of VT S/P ICD implant with ICD generator change 2/21/13.  He had a Medtronic [place] lead implanted with subsequent device interrogation significant for noise detected on the lead after he noted "beeping" from the device.  He is s/p laser assisted extraction by Dr. Ventura and re-implantation of an MRI compatible system on 5/20/19.  He presents for routine follow-up and offers no acute complaints. \par \par Occasionally aware of "PVC's" that he describes as a skipping in the chest; not bothersome recently. Taking Atenolol.

## 2021-11-05 NOTE — PROCEDURE
[No] : not [NSR] : normal sinus rhythm [ICD] : Implantable cardioverter-defibrillator [Medtronic] : Medtronic [VVI] : VVI [Voltage: ___ volts] : Voltage was [unfilled] volts [Lead Imp:  ___ohms] : lead impedance was [unfilled] ohms [Sensing Amplitude ___mv] : sensing amplitude was [unfilled] mv [___V @] : [unfilled] V [___ ms] : [unfilled] ms [None] : none [Sense ___ %] : Sense [unfilled]% [See Scanned Paceart Report] : See scanned paceart report [de-identified] : REED [de-identified] : POOJA AF MRI [de-identified] : 40 [de-identified] : 5/20/19 [de-identified] : 10 years [de-identified] : no events. \par % \par \par PVC counter: 1.2 per hour.  PVC with 2-4 beats -- 0.3 per hour

## 2021-12-21 ENCOUNTER — NON-APPOINTMENT (OUTPATIENT)
Age: 56
End: 2021-12-21

## 2021-12-21 ENCOUNTER — APPOINTMENT (OUTPATIENT)
Dept: HEART AND VASCULAR | Facility: CLINIC | Age: 56
End: 2021-12-21
Payer: COMMERCIAL

## 2021-12-21 PROCEDURE — 93296 REM INTERROG EVL PM/IDS: CPT | Mod: NC

## 2021-12-21 PROCEDURE — 93295 DEV INTERROG REMOTE 1/2/MLT: CPT | Mod: NC

## 2021-12-23 ENCOUNTER — RX RENEWAL (OUTPATIENT)
Age: 56
End: 2021-12-23

## 2021-12-23 RX ORDER — VALACYCLOVIR 500 MG/1
500 TABLET, FILM COATED ORAL TWICE DAILY
Qty: 60 | Refills: 0 | Status: ACTIVE | COMMUNITY
Start: 2020-09-09 | End: 1900-01-01

## 2022-03-22 ENCOUNTER — NON-APPOINTMENT (OUTPATIENT)
Age: 57
End: 2022-03-22

## 2022-03-22 ENCOUNTER — APPOINTMENT (OUTPATIENT)
Dept: HEART AND VASCULAR | Facility: CLINIC | Age: 57
End: 2022-03-22
Payer: COMMERCIAL

## 2022-03-22 PROCEDURE — 93296 REM INTERROG EVL PM/IDS: CPT

## 2022-03-22 PROCEDURE — 93295 DEV INTERROG REMOTE 1/2/MLT: CPT

## 2022-05-23 ENCOUNTER — RX RENEWAL (OUTPATIENT)
Age: 57
End: 2022-05-23

## 2022-06-21 ENCOUNTER — NON-APPOINTMENT (OUTPATIENT)
Age: 57
End: 2022-06-21

## 2022-06-21 ENCOUNTER — APPOINTMENT (OUTPATIENT)
Dept: HEART AND VASCULAR | Facility: CLINIC | Age: 57
End: 2022-06-21

## 2022-06-21 PROCEDURE — 93296 REM INTERROG EVL PM/IDS: CPT

## 2022-06-21 PROCEDURE — 93295 DEV INTERROG REMOTE 1/2/MLT: CPT

## 2022-08-05 ENCOUNTER — APPOINTMENT (OUTPATIENT)
Dept: HEART AND VASCULAR | Facility: CLINIC | Age: 57
End: 2022-08-05

## 2022-08-05 VITALS
HEART RATE: 60 BPM | BODY MASS INDEX: 34.86 KG/M2 | SYSTOLIC BLOOD PRESSURE: 131 MMHG | HEIGHT: 68 IN | DIASTOLIC BLOOD PRESSURE: 83 MMHG | TEMPERATURE: 97.9 F | WEIGHT: 230 LBS

## 2022-08-05 PROCEDURE — 93282 PRGRMG EVAL IMPLANTABLE DFB: CPT

## 2022-08-05 NOTE — HISTORY OF PRESENT ILLNESS
[None] : The patient complains of no symptoms [de-identified] : This is a 57 yo man with a history of VT S/P ICD implant with ICD generator change 2/21/13.  He had a Medtronic [place] lead implanted with subsequent device interrogation significant for noise detected on the lead after he noted "beeping" from the device.  He is s/p laser assisted extraction by Dr. Ventura and re-implantation of an MRI compatible system on 5/20/19.  He presents for routine follow-up and offers no acute complaints. \par \par Occasionally aware of "PVC's" that he describes as a skipping in the chest; not bothersome recently. Taking Atenolol. \par \par Interval history significant for elbow injury.

## 2022-08-05 NOTE — PROCEDURE
[No] : not [NSR] : normal sinus rhythm [See Scanned Paceart Report] : See scanned paceart report [ICD] : Implantable cardioverter-defibrillator [Medtronic] : Medtronic [VVI] : VVI [Voltage: ___ volts] : Voltage was [unfilled] volts [Lead Imp:  ___ohms] : lead impedance was [unfilled] ohms [Sensing Amplitude ___mv] : sensing amplitude was [unfilled] mv [___V @] : [unfilled] V [___ ms] : [unfilled] ms [None] : none [Sense ___ %] : Sense [unfilled]% [de-identified] : REED [de-identified] : POOJA AF MRI [de-identified] : 5/20/19 [de-identified] : 40 [de-identified] : 9.3 years [de-identified] : no events. \par % \par \par

## 2022-10-14 ENCOUNTER — APPOINTMENT (OUTPATIENT)
Dept: HEART AND VASCULAR | Facility: CLINIC | Age: 57
End: 2022-10-14

## 2022-10-14 ENCOUNTER — NON-APPOINTMENT (OUTPATIENT)
Age: 57
End: 2022-10-14

## 2022-10-14 PROCEDURE — 93296 REM INTERROG EVL PM/IDS: CPT

## 2022-10-14 PROCEDURE — 93295 DEV INTERROG REMOTE 1/2/MLT: CPT

## 2022-11-30 ENCOUNTER — APPOINTMENT (OUTPATIENT)
Dept: PULMONOLOGY | Facility: CLINIC | Age: 57
End: 2022-11-30

## 2022-11-30 VITALS — HEIGHT: 68 IN | WEIGHT: 225 LBS | BODY MASS INDEX: 34.1 KG/M2

## 2022-11-30 PROCEDURE — 99213 OFFICE O/P EST LOW 20 MIN: CPT | Mod: 95

## 2022-11-30 NOTE — HISTORY OF PRESENT ILLNESS
[TextBox_4] : Mr. Castellon is 57 year old, current smoking, male who has a history of COPD, asthma, VT w/ ICD, insomnia, OSAS allergies and sinus. He presents via video for an acute care visit.\par \par His chief concern is cough and wheezing that is keeping him awake at night x 3 days.\par \par Patient states he has a "heavy cough" that is productive of bad tasting mucus.  He admits to loud wheezing.  He complains of minimal shortness of breath on exertion while walking his dog.  He also complains of chest tightness and nasal congestion with yellow nasal mucus.\par \par Patient not currently on any inhaler therapy.\par Patient states he did at home rapid COVID test x 2, both resulted negative.\par \par Patient denies known sick contacts - although he is a  and around people frequently. \par Patient denies fever/chills, shortness of breath at rest, or decreased appetite.

## 2022-11-30 NOTE — REASON FOR VISIT
[Home] : at home, [unfilled] , at the time of the visit. [Medical Office: ([facility])___] : at the medical office located in  [Patient] : the patient [Acute] : an acute visit [Abnormal CXR/ Chest CT] : an abnormal CXR/ chest CT [Cough] : cough [COPD] : COPD [Shortness of Breath] : shortness of breath [Wheezing] : wheezing [Nicotine Dependence] : nicotine dependence

## 2022-11-30 NOTE — REVIEW OF SYSTEMS
[Sinus Problems] : sinus problems [Cough] : cough [Chest Tightness] : chest tightness [Sputum] : sputum [Wheezing] : wheezing [SOB on Exertion] : sob on exertion [Negative] : Endocrine [Dyspnea] : no dyspnea

## 2022-11-30 NOTE — ASSESSMENT
[FreeTextEntry1] : Mr. Castellno is 57 year old, current smoking, male who has a history of COPD, asthma, VT w/ ICD, insomnia, OSAS allergies and sinus. He presents via video for an acute care visit. His chief concern is cough and wheezing that is keeping him awake at night x 3 days.\par \par 1. Cough:\par - Advised patient to have RVP; patient refused to go.  Explained this will help discern bacterial v. viral and if antibiotic therapy is warranted.  Patient states he will not get one as he does not agree with Covid protocols and does not want a formal test for covid or to be entered into a database. \par \par 2.  Acute Bronchitis:\par - Add Prednisone: Take 30 mg x 5 days, 20 mg x 5 days, and 10 mg x 5 days.\par - Add Azithromycin 500 mg PO x 5 days.\par - Add Albuterol via nebulizer Q6H. \par \par If symptoms worsen - will RX CXR.\par \par Patient to follow up with Dr. Ac as scheduled for in person visit and PFTs for 3/2023. \par Patient to call with further questions and concerns.\par Patient verbalizes understanding of care plan and is agreeable.\par \par \par \par

## 2023-01-17 ENCOUNTER — APPOINTMENT (OUTPATIENT)
Dept: HEART AND VASCULAR | Facility: CLINIC | Age: 58
End: 2023-01-17
Payer: COMMERCIAL

## 2023-01-17 ENCOUNTER — NON-APPOINTMENT (OUTPATIENT)
Age: 58
End: 2023-01-17

## 2023-01-17 PROCEDURE — 93295 DEV INTERROG REMOTE 1/2/MLT: CPT

## 2023-01-17 PROCEDURE — 93296 REM INTERROG EVL PM/IDS: CPT

## 2023-02-14 ENCOUNTER — NON-APPOINTMENT (OUTPATIENT)
Age: 58
End: 2023-02-14

## 2023-02-14 ENCOUNTER — APPOINTMENT (OUTPATIENT)
Dept: HEART AND VASCULAR | Facility: CLINIC | Age: 58
End: 2023-02-14
Payer: COMMERCIAL

## 2023-02-14 VITALS — DIASTOLIC BLOOD PRESSURE: 83 MMHG | SYSTOLIC BLOOD PRESSURE: 133 MMHG

## 2023-02-14 PROCEDURE — 93282 PRGRMG EVAL IMPLANTABLE DFB: CPT

## 2023-02-14 RX ORDER — PREDNISONE 10 MG/1
10 TABLET ORAL
Qty: 31 | Refills: 0 | Status: DISCONTINUED | COMMUNITY
Start: 2022-11-30 | End: 2023-02-14

## 2023-02-14 RX ORDER — ALBUTEROL SULFATE 2.5 MG/3ML
(2.5 MG/3ML) SOLUTION RESPIRATORY (INHALATION)
Qty: 1 | Refills: 1 | Status: DISCONTINUED | COMMUNITY
Start: 2022-11-30 | End: 2023-02-14

## 2023-02-14 RX ORDER — AZITHROMYCIN 500 MG/1
500 TABLET, FILM COATED ORAL DAILY
Qty: 5 | Refills: 0 | Status: DISCONTINUED | COMMUNITY
Start: 2022-11-30 | End: 2023-02-14

## 2023-02-14 NOTE — HISTORY OF PRESENT ILLNESS
[None] : The patient complains of no symptoms [de-identified] : This is a 56 yo man with a history of VT S/P ICD implant with ICD generator change 2/21/13.  He had a Medtronic [place] lead implanted with subsequent device interrogation significant for noise detected on the lead after he noted "beeping" from the device.  He is s/p laser assisted extraction by Dr. Ventura and re-implantation of an MRI compatible system on 5/20/19.  He presents for routine follow-up.\par \par He quit smoking 27 days ago.  Admits to feeling stressed since he quit. \par \par Taking Atenolol for suppression of palpitations. \par \par SEE PACEART

## 2023-02-14 NOTE — PROCEDURE
[No] : not [NSR] : normal sinus rhythm [See Scanned Paceart Report] : See scanned paceart report [ICD] : Implantable cardioverter-defibrillator [Medtronic] : Medtronic [VVI] : VVI [Voltage: ___ volts] : Voltage was [unfilled] volts [Lead Imp:  ___ohms] : lead impedance was [unfilled] ohms [Sensing Amplitude ___mv] : sensing amplitude was [unfilled] mv [___V @] : [unfilled] V [___ ms] : [unfilled] ms [None] : none [Sense ___ %] : Sense [unfilled]% [de-identified] : REED [de-identified] : POOJA AF MRI [de-identified] : 5/20/19 [de-identified] : 40 [de-identified] : \par

## 2023-02-14 NOTE — ADDENDUM
[FreeTextEntry1] : I, Breann Campos, am scribing for and the presence of Dr. Long the following sections: HPI, PMH,Family/social history, ROS, Physical Exam, Assessment / Plan.\par \par I, Parish Long, personally performed the services described in the documentation, reviewed the documentation recorded by the scribe in my presence and it accurately and completely records my words and actions.\par

## 2023-03-06 ENCOUNTER — APPOINTMENT (OUTPATIENT)
Dept: PULMONOLOGY | Facility: CLINIC | Age: 58
End: 2023-03-06

## 2023-05-05 ENCOUNTER — APPOINTMENT (OUTPATIENT)
Dept: HEART AND VASCULAR | Facility: CLINIC | Age: 58
End: 2023-05-05
Payer: COMMERCIAL

## 2023-05-05 ENCOUNTER — NON-APPOINTMENT (OUTPATIENT)
Age: 58
End: 2023-05-05

## 2023-05-05 PROCEDURE — 93296 REM INTERROG EVL PM/IDS: CPT

## 2023-05-05 PROCEDURE — 93295 DEV INTERROG REMOTE 1/2/MLT: CPT

## 2023-05-11 ENCOUNTER — NON-APPOINTMENT (OUTPATIENT)
Age: 58
End: 2023-05-11

## 2023-05-11 ENCOUNTER — APPOINTMENT (OUTPATIENT)
Dept: PULMONOLOGY | Facility: CLINIC | Age: 58
End: 2023-05-11
Payer: COMMERCIAL

## 2023-05-11 VITALS
DIASTOLIC BLOOD PRESSURE: 62 MMHG | RESPIRATION RATE: 16 BRPM | TEMPERATURE: 97.3 F | OXYGEN SATURATION: 98 % | HEIGHT: 68 IN | HEART RATE: 60 BPM | SYSTOLIC BLOOD PRESSURE: 136 MMHG | WEIGHT: 245 LBS | BODY MASS INDEX: 37.13 KG/M2

## 2023-05-11 DIAGNOSIS — M94.0 CHONDROCOSTAL JUNCTION SYNDROME [TIETZE]: ICD-10-CM

## 2023-05-11 DIAGNOSIS — Z87.891 PERSONAL HISTORY OF NICOTINE DEPENDENCE: ICD-10-CM

## 2023-05-11 DIAGNOSIS — Z87.09 PERSONAL HISTORY OF OTHER DISEASES OF THE RESPIRATORY SYSTEM: ICD-10-CM

## 2023-05-11 PROCEDURE — 99214 OFFICE O/P EST MOD 30 MIN: CPT | Mod: 25

## 2023-05-11 PROCEDURE — 94729 DIFFUSING CAPACITY: CPT

## 2023-05-11 PROCEDURE — 94010 BREATHING CAPACITY TEST: CPT

## 2023-05-11 PROCEDURE — 94727 GAS DIL/WSHOT DETER LNG VOL: CPT

## 2023-05-11 PROCEDURE — 95012 NITRIC OXIDE EXP GAS DETER: CPT

## 2023-05-11 PROCEDURE — 71046 X-RAY EXAM CHEST 2 VIEWS: CPT

## 2023-05-11 NOTE — ADDENDUM
[FreeTextEntry1] : Documented by ZBIGNIEW England acting as a scribe for Dr. Kt Ac on 05/11/2023 .\par \par All medical record entries made by the Scribe were at my, Dr. Kt Ac's, direction and personally dictated by me on 05/11/2023. I have reviewed the chart and agree that the record accurately reflects my personal performance of the history, physical exam, assessment and plan. I have also personally directed, reviewed, and agree with the discharge instructions.

## 2023-05-11 NOTE — PHYSICAL EXAM
[No Acute Distress] : no acute distress [Normal Oropharynx] : normal oropharynx [I] : Mallampati Class: I [Normal Appearance] : normal appearance [No Neck Mass] : no neck mass [Normal Rate/Rhythm] : normal rate/rhythm [Normal S1, S2] : normal s1, s2 [No Murmurs] : no murmurs [No Resp Distress] : no resp distress [Clear to Auscultation Bilaterally] : clear to auscultation bilaterally [No Abnormalities] : no abnormalities [Benign] : benign [Normal Gait] : normal gait [No Clubbing] : no clubbing [No Cyanosis] : no cyanosis [No Edema] : no edema [FROM] : FROM [Normal Color/ Pigmentation] : normal color/ pigmentation [No Focal Deficits] : no focal deficits [Oriented x3] : oriented x3 [Normal Affect] : normal affect [TextBox_68] : I:E ratio 1:3; clear

## 2023-05-11 NOTE — PROCEDURE
[FreeTextEntry1] : PFT reveals normal flows, with an FEV1 of 2.75 L, which is 82% of predicted, with a normal flow volume loop.\par PFTs were performed to evaluate for sob\par \par \par FULL PFTs reveals normal flows; FEV1 was  3.10L which is 92% of predicted; normal lung volumes; normal diffusion of 30.6 , which is 109% of predicted; normal flow volume loop ; tested for COPD / SOB \par \par CXR reveals a normal sized heart; AICD in place\par \par FENO was 36; a normal value being less than 25\par Fractional exhaled nitric oxide (FENO) is regarded as a simple, noninvasive method for assessing eosinophilic airway inflammation. Produced by a variety of cells within the lung, nitric oxide (NO) concentrations are generally low in healthy individuals. However, high concentrations of NO appear to be involved in nonspecific host defense mechanisms and chronic inflammatory diseases such as asthma. The American Thoracic Society (ATS) therefore has recommended using FENO to aid in the diagnosis and monitoring of eosinophilic airway inflammation and asthma, and for identifying steroid responsive individuals whose chronic respiratory symptoms may be caused by airway inflammation.

## 2023-05-11 NOTE — ASSESSMENT
[FreeTextEntry1] : Mr. Castellon is 57 year old Male who has a history of nicotine addiction (resolved), COPD, asthma, insomnia, OSAS allergies and sinus. Number one issue is his sleep and weight. \par \par problem 1: COPD/asthma (stable) \par -continue Ventolin 2 inhalations QHS\par -continue Trelegy 100 1 inhalation QD\par \par -COPD is a progressive disease and although it can’t be cured , appropriate management can slow its progression, reduce frequency and severity of exacerbations, and improve symptoms and the patient quality of life. Hospitalizations are the greatest contributor to the total COPD costs and account for up to 87% of total COPD related costs. Exacerbations are the main cause of admissions and subsequently account for the 40-75% of COPD costs. Inhaled maintenance therapy reduces the incidence of exacerbations in patients with stable COPD. Incorrect inhaler use and nonadherence are major obstacles to achieving COPD treatment goals. Many COPD patients have challenges (impaired inhalation, limited dexterity, reduced cognition: that limit their ability to correctly use their COPD treatment devices resulting in reduced symptom control. Of most importance is smoking cessation and early intervention with respiratory illnesses and contemplation for pulmonary rehab to enhance quality of life.\par -Asthma is  believed to be caused by inherited (genetic) and environmental factor, but its exact cause is unknown. Asthma may be triggered by allergens, lung infections, or irritants in the air. Asthma triggers are different for each person\par -Inhaler technique reviewed as well as oral hygiene techniques reviewed with patient. Avoidance of cold air, extremes of temperature; rescue inhaler should be used before exercise. Order of medication reviewed with patient. Recommended use of a cool mist humidifier in the bedroom. \par \par Problem 1A: cardiac \par -Dr. Long\par \par Problem 1B: Chest Discomfort - c/w costochondritis\par -Add Arthrotec -Past\par -Recommended to use Topricin Cream or Australian Dream \par -recommended to use exercises shown in office\par \par problem 2: Nicotine Addiction- resolved 5/2023\par -Discussed for five minutes with the patient the risks/associations with continued smoking including COPD, emphysema, shortness of breath, renal cancer, bladder cancer, stroke risk, cardiac disease, etc. Smoking cessation was discussed at length and highly encouraged. Various options to aid cessation was discussed including use of Chantix, Nicotrol, nicotine products, laser therapy, hypnosis, Wellbutrin, etc.\par \par problem 3 : allergic rhinitis\par -continue to use nasal saline\par -can continue Zyrtec 10 mg QHS \par -Flonase 1 sniff BID \par -continue Olopatadine 0.6%1 sniff/nostril BID \par -continue Olopatadine 0.2 one drop/eye  \par \par -Environmental measures for allergies were encouraged including mattress and pillow cover, air purifier, and environmental controls.\par \par problem 5: abnormal chest CT (lung cancer screening CT 05/11/2023 )\par -follow up chest CT 9/2021- rescripted\par \par Lung cancer screening is recommended for people between the ages of 55 and 80 with prior 30+ pack year smoking histories. There is irrefutable evidence for realization of lung cancer screening based on two large randomized control trials demonstrating relative reduction in lung cancer mortality for patients undergoing low-dose CT scanning. Risks and benefits reviewed with the patient.\par \par problem 6: Severe OSAS\par -he is awaiting his oral appliance  (insurance approval) - Cata / Delvin\par -recommended iNAP SUMAYA therapy\par -he is unable to tolerate CPAP\par -recommended Insomnitol\par -Recommended to use Slumberbump, Somnifix, and Oxy-Aid\par \par -Sleep apnea is associated with adverse clinical consequences which an affect most organ systems.  Cardiovascular disease risk includes arrhythmias, atrial fibrillation, hypertension, coronary artery disease, and stroke. Metabolic disorders include diabetes type 2, non-alcoholic fatty liver disease. Mood disorder especially depression; and cognitive decline especially in the elderly. Associations with  chronic reflux/Gamez’s esophagus some but not all inclusive. \par -Reasons to assess this include arousal consistent with hypopnea; respiratory events most prominent in REM sleep or supine position; therefore sleep staging and body position are important for accurate diagnosis and estimation of AHI.\par \par problem 7: overweight (in progress)\par -Weight loss, exercise, and diet control were discussed and are highly encouraged. Treatment options were given such as, aqua therapy, and contacting a nutritionist. Recommended to use the elliptical, stationary bike, less use of treadmill.  Obesity is associated with worsening asthma, shortness of breath, and potential for cardiac disease, diabetes, and other underlying medical conditions. \par \par problem 8: GERD\par -diet controlled \par - -Things to avoid including overeating, spicy foods, tight clothing, eating within three hours of bed, this list is not all inclusive. \par -For treatment of reflux, possible options discussed including diet control, H2 blockers, PPIs, as well as coating motility agents discussed as treatment options. Timing of meals and proximity of last meal to sleep were discussed. If symptoms persist, a formal gastrointestinal evaluation is needed.\par \par Problem 9: health maintenance\par -pt has fear of Covid vaccine\par -recommended yearly flu shot- refused\par -recommended strep pneumonia vaccines after age 65: Prevnar-13 vaccine, followed by Pneumo vaccine 23 one year following\par -recommended early intervention for URIs\par -recommended regular osteoporosis evaluations\par -recommended early dermatological evaluations\par -recommended after the age of 50 to the age of 70, colonoscopy every 5 years \par \par F/U in 4 months\par He is encouraged to call with any changes, concerns, or questions.

## 2023-05-11 NOTE — HISTORY OF PRESENT ILLNESS
[FreeTextEntry1] : Mr. Castellon is a 57 year old male presenting to the office today for a follow up visit for abnormal chest CT, asthma, allergies, COPD, GERD, and OSAS. His chief complaint is \par \par -he notes smoking cessation for 4 months\par -he notes poor quality of sleep due to multiple interruptions\par -he notes poor sleep maintenance\par -he notes nocturia\par -he notes vision is stable \par -he notes bowels are regular \par -he notes gaining weight (15 lbs)\par -he notes exercising \par -he notes pending trip to [place]\par \par -he denies any headaches, nausea, emesis, fever, chills, sweats, chest pain, chest pressure, coughing, wheezing, palpitations, constipation, diarrhea, vertigo, dysphagia, heartburn, reflux, itchy eyes, itchy ears, leg swelling, arthralgias, myalgias, or sour taste in the mouth.

## 2023-05-20 ENCOUNTER — NON-APPOINTMENT (OUTPATIENT)
Age: 58
End: 2023-05-20

## 2023-05-20 VITALS — HEIGHT: 68 IN | WEIGHT: 240 LBS | BODY MASS INDEX: 36.37 KG/M2

## 2023-05-20 NOTE — HISTORY OF PRESENT ILLNESS
[Former] : Former [TextBox_13] : Referred by Dr. Ac\par \par Mr. HATTIE CONTEH is a 57 year old man with a history of nicotine dependence\par \par  Over the telephone today we reviewed and confirmed that the patient meets screening eligibility criteria:\par \par -Age: 57 years old\par \par Smoking status:\par \par -Former smoker, quit 4 months ago, ,5-1.5 ppdx 41\par \par \par \par \par Mr. CONTEH denies any personal history of lung cancer. Denies any s/s of lung cancer. No lung cancer in a 1st degree relative. Denies any history of lung disease. Denies any history of occupational exposures.\par  [PacksperDay] : .5-1.5 [PacksperYear] : 30 [N_Years] : 40

## 2023-05-22 ENCOUNTER — APPOINTMENT (OUTPATIENT)
Dept: CT IMAGING | Facility: CLINIC | Age: 58
End: 2023-05-22
Payer: COMMERCIAL

## 2023-05-22 ENCOUNTER — OUTPATIENT (OUTPATIENT)
Dept: OUTPATIENT SERVICES | Facility: HOSPITAL | Age: 58
LOS: 1 days | End: 2023-05-22
Payer: COMMERCIAL

## 2023-05-22 DIAGNOSIS — Z00.8 ENCOUNTER FOR OTHER GENERAL EXAMINATION: ICD-10-CM

## 2023-05-22 DIAGNOSIS — Z95.810 PRESENCE OF AUTOMATIC (IMPLANTABLE) CARDIAC DEFIBRILLATOR: Chronic | ICD-10-CM

## 2023-05-22 DIAGNOSIS — Z98.890 OTHER SPECIFIED POSTPROCEDURAL STATES: Chronic | ICD-10-CM

## 2023-05-22 DIAGNOSIS — Z98.52 VASECTOMY STATUS: Chronic | ICD-10-CM

## 2023-05-22 PROCEDURE — 71271 CT THORAX LUNG CANCER SCR C-: CPT

## 2023-05-22 PROCEDURE — 71271 CT THORAX LUNG CANCER SCR C-: CPT | Mod: 26

## 2023-05-30 ENCOUNTER — NON-APPOINTMENT (OUTPATIENT)
Age: 58
End: 2023-05-30

## 2023-08-04 ENCOUNTER — NON-APPOINTMENT (OUTPATIENT)
Age: 58
End: 2023-08-04

## 2023-08-04 ENCOUNTER — APPOINTMENT (OUTPATIENT)
Dept: HEART AND VASCULAR | Facility: CLINIC | Age: 58
End: 2023-08-04
Payer: COMMERCIAL

## 2023-08-04 PROCEDURE — 93296 REM INTERROG EVL PM/IDS: CPT

## 2023-08-04 PROCEDURE — 93295 DEV INTERROG REMOTE 1/2/MLT: CPT

## 2023-09-28 ENCOUNTER — APPOINTMENT (OUTPATIENT)
Dept: PULMONOLOGY | Facility: CLINIC | Age: 58
End: 2023-09-28
Payer: COMMERCIAL

## 2023-09-28 VITALS
SYSTOLIC BLOOD PRESSURE: 138 MMHG | WEIGHT: 242 LBS | DIASTOLIC BLOOD PRESSURE: 70 MMHG | HEIGHT: 68 IN | BODY MASS INDEX: 36.68 KG/M2 | OXYGEN SATURATION: 98 % | RESPIRATION RATE: 16 BRPM | TEMPERATURE: 97.1 F | HEART RATE: 60 BPM

## 2023-09-28 DIAGNOSIS — R93.89 ABNORMAL FINDINGS ON DIAGNOSTIC IMAGING OF OTHER SPECIFIED BODY STRUCTURES: ICD-10-CM

## 2023-09-28 DIAGNOSIS — J44.9 CHRONIC OBSTRUCTIVE PULMONARY DISEASE, UNSPECIFIED: ICD-10-CM

## 2023-09-28 DIAGNOSIS — R05.9 COUGH, UNSPECIFIED: ICD-10-CM

## 2023-09-28 PROCEDURE — 71046 X-RAY EXAM CHEST 2 VIEWS: CPT

## 2023-09-28 PROCEDURE — 99214 OFFICE O/P EST MOD 30 MIN: CPT | Mod: 25

## 2023-09-28 RX ORDER — ALBUTEROL SULFATE 90 UG/1
108 (90 BASE) INHALANT RESPIRATORY (INHALATION)
Qty: 1 | Refills: 1 | Status: ACTIVE | COMMUNITY
Start: 2023-09-28 | End: 1900-01-01

## 2023-09-28 RX ORDER — ALBUTEROL SULFATE 2.5 MG/3ML
(2.5 MG/3ML) SOLUTION RESPIRATORY (INHALATION)
Qty: 120 | Refills: 0 | Status: ACTIVE | COMMUNITY
Start: 2023-05-11 | End: 1900-01-01

## 2023-09-28 RX ORDER — AZITHROMYCIN 500 MG/1
500 TABLET, FILM COATED ORAL DAILY
Qty: 7 | Refills: 0 | Status: ACTIVE | COMMUNITY
Start: 2023-09-28 | End: 1900-01-01

## 2023-09-28 RX ORDER — FLUTICASONE FUROATE, UMECLIDINIUM BROMIDE AND VILANTEROL TRIFENATATE 100; 62.5; 25 UG/1; UG/1; UG/1
100-62.5-25 POWDER RESPIRATORY (INHALATION)
Qty: 1 | Refills: 2 | Status: DISCONTINUED | COMMUNITY
Start: 2023-05-11 | End: 2023-09-28

## 2023-09-28 RX ORDER — BUDESONIDE AND FORMOTEROL FUMARATE DIHYDRATE 160; 4.5 UG/1; UG/1
160-4.5 AEROSOL RESPIRATORY (INHALATION) TWICE DAILY
Qty: 1 | Refills: 3 | Status: ACTIVE | COMMUNITY
Start: 2023-09-28 | End: 1900-01-01

## 2023-10-09 ENCOUNTER — APPOINTMENT (OUTPATIENT)
Dept: PULMONOLOGY | Facility: CLINIC | Age: 58
End: 2023-10-09
Payer: COMMERCIAL

## 2023-10-09 VITALS
HEIGHT: 68 IN | DIASTOLIC BLOOD PRESSURE: 84 MMHG | TEMPERATURE: 97.3 F | RESPIRATION RATE: 16 BRPM | OXYGEN SATURATION: 97 % | BODY MASS INDEX: 36.68 KG/M2 | WEIGHT: 242 LBS | SYSTOLIC BLOOD PRESSURE: 120 MMHG | HEART RATE: 60 BPM

## 2023-10-09 DIAGNOSIS — K21.9 GASTRO-ESOPHAGEAL REFLUX DISEASE W/OUT ESOPHAGITIS: ICD-10-CM

## 2023-10-09 DIAGNOSIS — J18.9 PNEUMONIA, UNSPECIFIED ORGANISM: ICD-10-CM

## 2023-10-09 PROCEDURE — 95012 NITRIC OXIDE EXP GAS DETER: CPT

## 2023-10-09 PROCEDURE — 94727 GAS DIL/WSHOT DETER LNG VOL: CPT

## 2023-10-09 PROCEDURE — 94729 DIFFUSING CAPACITY: CPT

## 2023-10-09 PROCEDURE — 94010 BREATHING CAPACITY TEST: CPT

## 2023-10-09 PROCEDURE — 71046 X-RAY EXAM CHEST 2 VIEWS: CPT

## 2023-10-09 PROCEDURE — 99214 OFFICE O/P EST MOD 30 MIN: CPT | Mod: 25

## 2023-10-09 RX ORDER — PREDNISONE 10 MG/1
10 TABLET ORAL
Qty: 100 | Refills: 1 | Status: ACTIVE | COMMUNITY
Start: 2023-10-09 | End: 1900-01-01

## 2023-10-09 RX ORDER — BUDESONIDE, GLYCOPYRROLATE, AND FORMOTEROL FUMARATE 160; 9; 4.8 UG/1; UG/1; UG/1
160-9-4.8 AEROSOL, METERED RESPIRATORY (INHALATION)
Qty: 3 | Refills: 1 | Status: ACTIVE | COMMUNITY
Start: 2023-10-09 | End: 1900-01-01

## 2023-11-03 ENCOUNTER — NON-APPOINTMENT (OUTPATIENT)
Age: 58
End: 2023-11-03

## 2023-11-03 ENCOUNTER — APPOINTMENT (OUTPATIENT)
Dept: HEART AND VASCULAR | Facility: CLINIC | Age: 58
End: 2023-11-03
Payer: COMMERCIAL

## 2023-11-03 PROCEDURE — 93296 REM INTERROG EVL PM/IDS: CPT

## 2023-11-03 PROCEDURE — 93295 DEV INTERROG REMOTE 1/2/MLT: CPT

## 2024-02-22 ENCOUNTER — NON-APPOINTMENT (OUTPATIENT)
Age: 59
End: 2024-02-22

## 2024-02-27 ENCOUNTER — NON-APPOINTMENT (OUTPATIENT)
Age: 59
End: 2024-02-27

## 2024-02-27 ENCOUNTER — APPOINTMENT (OUTPATIENT)
Dept: HEART AND VASCULAR | Facility: CLINIC | Age: 59
End: 2024-02-27
Payer: COMMERCIAL

## 2024-02-27 VITALS
HEIGHT: 68 IN | SYSTOLIC BLOOD PRESSURE: 134 MMHG | DIASTOLIC BLOOD PRESSURE: 89 MMHG | HEART RATE: 59 BPM | BODY MASS INDEX: 36.68 KG/M2 | WEIGHT: 242 LBS

## 2024-02-27 DIAGNOSIS — Z95.810 PRESENCE OF AUTOMATIC (IMPLANTABLE) CARDIAC DEFIBRILLATOR: ICD-10-CM

## 2024-02-27 DIAGNOSIS — T82.110A BREAKDOWN (MECHANICAL) OF CARDIAC ELECTRODE, INITIAL ENCOUNTER: ICD-10-CM

## 2024-02-27 PROCEDURE — 93282 PRGRMG EVAL IMPLANTABLE DFB: CPT

## 2024-02-27 NOTE — ADDENDUM
[FreeTextEntry1] : I, Breann Campos, am scribing for and the presence of Dr. Long the following sections: HPI, PMH,Family/social history, ROS, Physical Exam, Assessment / Plan.  I, Parish Long, personally performed the services described in the documentation, reviewed the documentation recorded by the scribe in my presence and it accurately and completely records my words and actions.

## 2024-02-27 NOTE — HISTORY OF PRESENT ILLNESS
[None] : The patient complains of no symptoms [de-identified] : This is a 59 yo man with a history of VT S/P ICD implant with ICD generator change 2/21/13.  He had a [employer] [place] lead implanted with subsequent device interrogation significant for noise detected on the lead after he noted "beeping" from the device.  He is s/p laser assisted extraction by Dr. Ventura and re-implantation of an MRI compatible system on 5/20/19.  He presents for routine follow-up.  He quit smoking over one year ago.  He got  two weeks ago.    Taking Atenolol for suppression of palpitations.   SEE PACEART

## 2024-02-27 NOTE — PROCEDURE
[No] : not [NSR] : normal sinus rhythm [See Scanned Paceart Report] : See scanned paceart report [ICD] : Implantable cardioverter-defibrillator [Medtronic] : Medtronic [VVI] : VVI [Voltage: ___ volts] : Voltage was [unfilled] volts [Lead Imp:  ___ohms] : lead impedance was [unfilled] ohms [Sensing Amplitude ___mv] : sensing amplitude was [unfilled] mv [___V @] : [unfilled] V [___ ms] : [unfilled] ms [None] : none [Sense ___ %] : Sense [unfilled]% [de-identified] : REED [de-identified] : POOJA AF MRI [de-identified] : 5/20/19 [de-identified] : 40 [de-identified] : \par

## 2024-04-15 ENCOUNTER — APPOINTMENT (OUTPATIENT)
Dept: PULMONOLOGY | Facility: CLINIC | Age: 59
End: 2024-04-15
Payer: COMMERCIAL

## 2024-04-15 VITALS
HEIGHT: 68 IN | WEIGHT: 245 LBS | HEART RATE: 64 BPM | BODY MASS INDEX: 37.13 KG/M2 | RESPIRATION RATE: 16 BRPM | DIASTOLIC BLOOD PRESSURE: 70 MMHG | OXYGEN SATURATION: 97 % | SYSTOLIC BLOOD PRESSURE: 126 MMHG | TEMPERATURE: 97.4 F

## 2024-04-15 DIAGNOSIS — R06.83 SNORING: ICD-10-CM

## 2024-04-15 DIAGNOSIS — J30.9 ALLERGIC RHINITIS, UNSPECIFIED: ICD-10-CM

## 2024-04-15 DIAGNOSIS — G47.33 OBSTRUCTIVE SLEEP APNEA (ADULT) (PEDIATRIC): ICD-10-CM

## 2024-04-15 DIAGNOSIS — J45.909 UNSPECIFIED ASTHMA, UNCOMPLICATED: ICD-10-CM

## 2024-04-15 DIAGNOSIS — R93.89 ABNORMAL FINDINGS ON DIAGNOSTIC IMAGING OF OTHER SPECIFIED BODY STRUCTURES: ICD-10-CM

## 2024-04-15 PROCEDURE — 94010 BREATHING CAPACITY TEST: CPT

## 2024-04-15 PROCEDURE — 95012 NITRIC OXIDE EXP GAS DETER: CPT

## 2024-04-15 PROCEDURE — 99214 OFFICE O/P EST MOD 30 MIN: CPT | Mod: 25

## 2024-04-15 RX ORDER — AZELASTINE HYDROCHLORIDE AND FLUTICASONE PROPIONATE 137; 50 UG/1; UG/1
137-50 SPRAY, METERED NASAL
Qty: 3 | Refills: 1 | Status: ACTIVE | COMMUNITY
Start: 2024-04-15 | End: 1900-01-01

## 2024-04-15 NOTE — HISTORY OF PRESENT ILLNESS
[FreeTextEntry1] : Mr. Castellon is a 58 year old male presenting to the office today for a follow up visit for abnormal chest CT, asthma, allergies, COPD, GERD, and OSAS. His chief complaint is   - he notes having trouble with altitude last time he traveled there - he notes recently getting over a URI for which he was given 10 days of Augmentin and 2 weeks of Prednisone - he notes he is still feeling SOB even at rest - he notes he will be 15 months without smoking in a few days  - he notes he has not been exercising - he notes getting  2/20 - he notes taking Zyrtec daily for his allergic nasal congestion  -he denies any headaches, nausea, emesis, fever, chills, sweats, chest pain, chest pressure, coughing, wheezing, palpitations, constipation, diarrhea, vertigo, dysphagia, heartburn, reflux, itchy eyes, itchy ears, leg swelling, leg pain, arthralgias, myalgias, hoarseness, or sour taste in the mouth. Yes - the patient is able to be screened

## 2024-04-15 NOTE — PROCEDURE
[FreeTextEntry1] : PFT reveals mild restrictive and obstructive dysfunction, with an FEV1 of 2.60 L, which is 72.6% of predicted, with a normal flow volume loop. PFTs were performed to evaluate for SOB  FENO was 28; a normal value being less than 25 Fractional exhaled nitric oxide (FENO) is regarded as a simple, noninvasive method for assessing eosinophilic airway inflammation. Produced by a variety of cells within the lung, nitric oxide (NO) concentrations are generally low in healthy individuals. However, high concentrations of NO appear to be involved in nonspecific host defense mechanisms and chronic inflammatory diseases such as asthma. The American Thoracic Society (ATS) therefore has strongly recommended using FENO to aid in the assessment, management, and long-term monitoring of eosinophilic airway inflammation and asthma, and for identifying steroid responsive individuals whose chronic respiratory symptoms may be caused by airway inflammation. In their 2011 clinical practice guideline, the ATS emphasizes the importance of using FENO.

## 2024-04-15 NOTE — ADDENDUM
[FreeTextEntry1] : Documented by Ruy Cook acting as a scribe for Dr. Kt Ac on 04/15/2024.   All medical record entries made by the Scribe were at my, Dr. Kt Ac's, direction and personally dictated by me on 04/15/2024. I have reviewed the chart and agree that the record accurately reflects my personal performance of the history, physical exam, assessment and plan. I have also personally directed, reviewed, and agree with the discharge instructions.

## 2024-04-15 NOTE — ASSESSMENT
[FreeTextEntry1] : Mr. Castellon is 58 year old Male who has a history of nicotine addiction (resolved), COPD, asthma, insomnia, OSAS allergies and sinus. s/p LLL PNA / Bronchospasms 10/2023 - mild Allergic Sx  problem 1: COPD/asthma  -add Ventolin 2 puffs Q6H via nebulizer -continue Trelegy 100 1 inhalation QD or Breztri 2 puffs BID -s/p Prednisone 20 mg x 7 days, 10 mg x 7 days, 3/2024 Information sheet given to the patient to be reviewed, this medication is never to be used without consulting the prescribing physician. Proper dietary restraint is necessary specifically salt containing foods, if any reaction may occur should be reported. -COPD is a progressive disease and although it can't be cured , appropriate management can slow its progression, reduce frequency and severity of exacerbations, and improve symptoms and the patient quality of life. Hospitalizations are the greatest contributor to the total COPD costs and account for up to 87% of total COPD related costs. Exacerbations are the main cause of admissions and subsequently account for the 40-75% of COPD costs. Inhaled maintenance therapy reduces the incidence of exacerbations in patients with stable COPD. Incorrect inhaler use and nonadherence are major obstacles to achieving COPD treatment goals. Many COPD patients have challenges (impaired inhalation, limited dexterity, reduced cognition: that limit their ability to correctly use their COPD treatment devices resulting in reduced symptom control. Of most importance is smoking cessation and early intervention with respiratory illnesses and contemplation for pulmonary rehab to enhance quality of life. -Asthma is  believed to be caused by inherited (genetic) and environmental factor, but its exact cause is unknown. Asthma may be triggered by allergens, lung infections, or irritants in the air. Asthma triggers are different for each person -Inhaler technique reviewed as well as oral hygiene techniques reviewed with patient. Avoidance of cold air, extremes of temperature; rescue inhaler should be used before exercise. Order of medication reviewed with patient. Recommended use of a cool mist humidifier in the bedroom.   Problem 1A: PNA (LLL) - resolved -radiographically resolve  -s/p Zithromax 500 mg x 7 days Your chest xray/CT reveals an infiltrate c/w pneumonia; this based on your clinical scenario required additional therapy. Any change in your status will require hospitalization at the nearest hospital and al local ambulance will need to be called. Our group is on staff at [facility] and [facility] as- consultants. The patient/family is instructed to notify our office with any change in condition.  Problem 1B: cardiac  -Dr. Long / [place] (pending)  Problem 1C: Chest Discomfort - c/w costochondritis (resolved) -Add Arthrotec - Past -Recommended to use Topricin Cream or Australian Dream  -recommended to use exercises shown in office  problem 2: Nicotine Addiction- resolved 5/2023 -Discussed for five minutes with the patient the risks/associations with continued smoking including COPD, emphysema, shortness of breath, renal cancer, bladder cancer, stroke risk, cardiac disease, etc. Smoking cessation was discussed at length and highly encouraged. Various options to aid cessation was discussed including use of Chantix, Nicotrol, nicotine products, laser therapy, hypnosis, Wellbutrin, etc.  problem 3: allergic rhinitis -continue to use nasal saline -can continue Zyrtec 10 mg QHS  -move from Flonase 1 sniff BID and Olopatadine 0.6%1 sniff/nostril BID to Ryaltris 1 sniff BID -continue Olopatadine 0.2 one drop/eye    -Environmental measures for allergies were encouraged including mattress and pillow cover, air purifier, and environmental controls.  problem 5: abnormal chest CT (lung cancer screening CT 05/11/2023 ) -follow up chest CT 5/2024  Lung cancer screening is recommended for people between the ages of 55 and 80 with prior 30+ pack year smoking histories. There is irrefutable evidence for realization of lung cancer screening based on two large randomized control trials demonstrating relative reduction in lung cancer mortality for patients undergoing low-dose CT scanning. Risks and benefits reviewed with the patient.  problem 6: Severe OSAS -he is awaiting his oral appliance  (insurance approval) - Cata / Delvin -recommended iNAP SUMAYA therapy -he is unable to tolerate CPAP -recommended Insomnitol -Recommended to use Slumberbump, Somnifix, and Oxy-Aid  -Sleep apnea is associated with adverse clinical consequences which an affect most organ systems.  Cardiovascular disease risk includes arrhythmias, atrial fibrillation, hypertension, coronary artery disease, and stroke. Metabolic disorders include diabetes type 2, non-alcoholic fatty liver disease. Mood disorder especially depression; and cognitive decline especially in the elderly. Associations with  chronic reflux/Gamez's esophagus some but not all inclusive.  -Reasons to assess this include arousal consistent with hypopnea; respiratory events most prominent in REM sleep or supine position; therefore sleep staging and body position are important for accurate diagnosis and estimation of AHI.  problem 7: overweight (in progress) -Weight loss, exercise, and diet control were discussed and are highly encouraged. Treatment options were given such as, aqua therapy, and contacting a nutritionist. Recommended to use the elliptical, stationary bike, less use of treadmill.  Obesity is associated with worsening asthma, shortness of breath, and potential for cardiac disease, diabetes, and other underlying medical conditions.   problem 8: GERD -diet controlled  - -Things to avoid including overeating, spicy foods, tight clothing, eating within three hours of bed, this list is not all inclusive.  -For treatment of reflux, possible options discussed including diet control, H2 blockers, PPIs, as well as coating motility agents discussed as treatment options. Timing of meals and proximity of last meal to sleep were discussed. If symptoms persist, a formal gastrointestinal evaluation is needed.  Problem 9: health maintenance -pt has fear of Covid vaccine -recommended yearly flu shot- refused -recommended strep pneumonia vaccines after age 65: Prevnar-13 vaccine, followed by Pneumo vaccine 23 one year following -recommended early intervention for URIs -recommended regular osteoporosis evaluations -recommended early dermatological evaluations -recommended after the age of 50 to the age of 70, colonoscopy every 5 years   F/U in 4 months He is encouraged to call with any changes, concerns, or questions.

## 2024-05-07 ENCOUNTER — APPOINTMENT (OUTPATIENT)
Dept: CARDIOLOGY | Facility: CLINIC | Age: 59
End: 2024-05-07
Payer: SELF-PAY

## 2024-05-07 ENCOUNTER — NON-APPOINTMENT (OUTPATIENT)
Age: 59
End: 2024-05-07

## 2024-05-07 VITALS
WEIGHT: 245 LBS | HEART RATE: 58 BPM | SYSTOLIC BLOOD PRESSURE: 112 MMHG | BODY MASS INDEX: 37.13 KG/M2 | HEIGHT: 68 IN | DIASTOLIC BLOOD PRESSURE: 64 MMHG | OXYGEN SATURATION: 97 %

## 2024-05-07 DIAGNOSIS — Z95.810 PRESENCE OF AUTOMATIC (IMPLANTABLE) CARDIAC DEFIBRILLATOR: ICD-10-CM

## 2024-05-07 DIAGNOSIS — I10 ESSENTIAL (PRIMARY) HYPERTENSION: ICD-10-CM

## 2024-05-07 DIAGNOSIS — R06.02 SHORTNESS OF BREATH: ICD-10-CM

## 2024-05-07 DIAGNOSIS — I47.20 VENTRICULAR TACHYCARDIA, UNSPECIFIED: ICD-10-CM

## 2024-05-07 PROCEDURE — G2211 COMPLEX E/M VISIT ADD ON: CPT | Mod: NC,1L

## 2024-05-07 PROCEDURE — 93000 ELECTROCARDIOGRAM COMPLETE: CPT

## 2024-05-07 PROCEDURE — 99204 OFFICE O/P NEW MOD 45 MIN: CPT | Mod: 25

## 2024-05-07 NOTE — HISTORY OF PRESENT ILLNESS
[FreeTextEntry1] : PCP: Dr. Mansfield EP: Dr. Long   58M with hx of VT s/p ICD who presents for cardiac evaluation  Pt reports in 2004, had an episode of "VT" during a stress test [facility] without CAD per patient Subsequently saw EP, had inducible VT on EPS, s/p ICD placement Pt had lead extraction in  due to lead malfunction s/p replacement Had generator change at the same time Pt reports being shocked x 5 during an exercise stress test many years ago, was told was not true VT but was due to T wave oversensing   Pt notes feeling more dyspnea with exertion Quit smoking 2023, since then has gained close to 30 lbs No chest pain, occasional sharp pain in L arm Of note, CT chest in  noted no coronary calcifications  Former smoker. M with heart disease, MI at 61,  at 71. 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 [employer] .   ECG: SR, no ST-T wave changes  Atenolol 50mg  Losartan 25mg-HCTZ 6.25mg

## 2024-05-07 NOTE — REVIEW OF SYSTEMS
0 = independent
[TextEntry] : 10 point review of systems is normal other than what is listed above in the history of present illness.

## 2024-05-07 NOTE — DISCUSSION/SUMMARY
[FreeTextEntry1] : VT s/p ICD: No recent shocks, following with EP. 2 short runs of NSVT on last interrogation, no strips available.   Continue atenolol ICD interrogations, EP follow up No recent ischemic eval on file, CT chest from 2023 noting no coronary calcifications, will arrange for CT coronaries to rule out obstructive CAD Repeat TTE  HTN: BP great today, continue atenolol, losartan-HCTZ  Borderline DM, HLD per patient on last labs. Consider statin. CTA to guide therapy. Was recommended by PCP for statin therapy   RV 3M  [EKG obtained to assist in diagnosis and management of assessed problem(s)] : EKG obtained to assist in diagnosis and management of assessed problem(s)

## 2024-05-15 ENCOUNTER — NON-APPOINTMENT (OUTPATIENT)
Age: 59
End: 2024-05-15

## 2024-05-16 ENCOUNTER — APPOINTMENT (OUTPATIENT)
Dept: HEART AND VASCULAR | Facility: CLINIC | Age: 59
End: 2024-05-16
Payer: COMMERCIAL

## 2024-05-16 PROCEDURE — 93296 REM INTERROG EVL PM/IDS: CPT

## 2024-05-16 PROCEDURE — 93295 DEV INTERROG REMOTE 1/2/MLT: CPT

## 2024-05-23 ENCOUNTER — NON-APPOINTMENT (OUTPATIENT)
Age: 59
End: 2024-05-23

## 2024-05-23 VITALS — WEIGHT: 245 LBS | BODY MASS INDEX: 37.13 KG/M2 | HEIGHT: 68 IN

## 2024-05-23 DIAGNOSIS — Z87.891 PERSONAL HISTORY OF NICOTINE DEPENDENCE: ICD-10-CM

## 2024-05-23 NOTE — HISTORY OF PRESENT ILLNESS
[Former] : Former [TextBox_13] : Referred by Dr. Ac  Mr. HATTIE CONTEH is a 58 year old man with a history of nicotine dependence   Shared decision making managed by Dr. Ac. Reviewed and confirmed that the patient meets screening eligibility criteria:  -Age: 58 years old  Smoking status:  -Former smoker, quit 1 yr ago ,5-1.5 ppdx 41 (30 pack years)     No personal history of lung cancer. NO s/s of lung cancer. No lung cancer in a 1st degree relative. No history of occupational exposures.  [PacksperDay] : .5-1.5 [N_Years] : 40 [PacksperYear] : 30

## 2024-05-28 ENCOUNTER — APPOINTMENT (OUTPATIENT)
Dept: CT IMAGING | Facility: CLINIC | Age: 59
End: 2024-05-28
Payer: COMMERCIAL

## 2024-05-28 ENCOUNTER — OUTPATIENT (OUTPATIENT)
Dept: OUTPATIENT SERVICES | Facility: HOSPITAL | Age: 59
LOS: 1 days | End: 2024-05-28
Payer: COMMERCIAL

## 2024-05-28 DIAGNOSIS — Z98.890 OTHER SPECIFIED POSTPROCEDURAL STATES: Chronic | ICD-10-CM

## 2024-05-28 DIAGNOSIS — Z00.8 ENCOUNTER FOR OTHER GENERAL EXAMINATION: ICD-10-CM

## 2024-05-28 DIAGNOSIS — Z95.810 PRESENCE OF AUTOMATIC (IMPLANTABLE) CARDIAC DEFIBRILLATOR: Chronic | ICD-10-CM

## 2024-05-28 DIAGNOSIS — Z98.52 VASECTOMY STATUS: Chronic | ICD-10-CM

## 2024-05-28 PROCEDURE — 71271 CT THORAX LUNG CANCER SCR C-: CPT | Mod: 26

## 2024-05-28 PROCEDURE — 71271 CT THORAX LUNG CANCER SCR C-: CPT

## 2024-05-30 ENCOUNTER — APPOINTMENT (OUTPATIENT)
Dept: CT IMAGING | Facility: CLINIC | Age: 59
End: 2024-05-30
Payer: COMMERCIAL

## 2024-05-30 PROCEDURE — 75574 CT ANGIO HRT W/3D IMAGE: CPT

## 2024-06-13 ENCOUNTER — APPOINTMENT (OUTPATIENT)
Dept: INTERNAL MEDICINE | Facility: CLINIC | Age: 59
End: 2024-06-13
Payer: COMMERCIAL

## 2024-06-13 PROCEDURE — 93306 TTE W/DOPPLER COMPLETE: CPT

## 2024-07-10 ENCOUNTER — APPOINTMENT (OUTPATIENT)
Dept: CARDIOLOGY | Facility: CLINIC | Age: 59
End: 2024-07-10
Payer: COMMERCIAL

## 2024-07-10 VITALS
HEIGHT: 68 IN | DIASTOLIC BLOOD PRESSURE: 60 MMHG | HEART RATE: 58 BPM | OXYGEN SATURATION: 95 % | BODY MASS INDEX: 38.49 KG/M2 | WEIGHT: 254 LBS | SYSTOLIC BLOOD PRESSURE: 140 MMHG

## 2024-07-10 VITALS — SYSTOLIC BLOOD PRESSURE: 110 MMHG | DIASTOLIC BLOOD PRESSURE: 60 MMHG

## 2024-07-10 DIAGNOSIS — I25.84 ATHEROSCLEROTIC HEART DISEASE OF NATIVE CORONARY ARTERY W/OUT ANGINA PECTORIS: ICD-10-CM

## 2024-07-10 DIAGNOSIS — Z95.810 PRESENCE OF AUTOMATIC (IMPLANTABLE) CARDIAC DEFIBRILLATOR: ICD-10-CM

## 2024-07-10 DIAGNOSIS — I25.10 ATHEROSCLEROTIC HEART DISEASE OF NATIVE CORONARY ARTERY W/OUT ANGINA PECTORIS: ICD-10-CM

## 2024-07-10 DIAGNOSIS — I10 ESSENTIAL (PRIMARY) HYPERTENSION: ICD-10-CM

## 2024-07-10 DIAGNOSIS — I47.20 VENTRICULAR TACHYCARDIA, UNSPECIFIED: ICD-10-CM

## 2024-07-10 PROCEDURE — 99214 OFFICE O/P EST MOD 30 MIN: CPT

## 2024-07-10 PROCEDURE — G2211 COMPLEX E/M VISIT ADD ON: CPT | Mod: NC

## 2024-08-18 ENCOUNTER — NON-APPOINTMENT (OUTPATIENT)
Age: 59
End: 2024-08-18

## 2024-08-19 ENCOUNTER — APPOINTMENT (OUTPATIENT)
Dept: HEART AND VASCULAR | Facility: CLINIC | Age: 59
End: 2024-08-19
Payer: COMMERCIAL

## 2024-08-19 PROCEDURE — 93296 REM INTERROG EVL PM/IDS: CPT

## 2024-08-19 PROCEDURE — 93295 DEV INTERROG REMOTE 1/2/MLT: CPT

## 2024-09-04 ENCOUNTER — APPOINTMENT (OUTPATIENT)
Dept: PULMONOLOGY | Facility: CLINIC | Age: 59
End: 2024-09-04

## 2024-11-04 ENCOUNTER — APPOINTMENT (OUTPATIENT)
Dept: CARDIOLOGY | Facility: CLINIC | Age: 59
End: 2024-11-04

## 2024-11-18 ENCOUNTER — APPOINTMENT (OUTPATIENT)
Dept: HEART AND VASCULAR | Facility: CLINIC | Age: 59
End: 2024-11-18

## 2024-12-16 ENCOUNTER — APPOINTMENT (OUTPATIENT)
Dept: PULMONOLOGY | Facility: CLINIC | Age: 59
End: 2024-12-16
Payer: COMMERCIAL

## 2024-12-16 VITALS
SYSTOLIC BLOOD PRESSURE: 124 MMHG | BODY MASS INDEX: 37.77 KG/M2 | WEIGHT: 255 LBS | HEART RATE: 66 BPM | DIASTOLIC BLOOD PRESSURE: 82 MMHG | HEIGHT: 69 IN | RESPIRATION RATE: 16 BRPM | OXYGEN SATURATION: 98 %

## 2024-12-16 DIAGNOSIS — R09.82 POSTNASAL DRIP: ICD-10-CM

## 2024-12-16 DIAGNOSIS — R06.02 SHORTNESS OF BREATH: ICD-10-CM

## 2024-12-16 DIAGNOSIS — K21.9 GASTRO-ESOPHAGEAL REFLUX DISEASE W/OUT ESOPHAGITIS: ICD-10-CM

## 2024-12-16 DIAGNOSIS — R93.89 ABNORMAL FINDINGS ON DIAGNOSTIC IMAGING OF OTHER SPECIFIED BODY STRUCTURES: ICD-10-CM

## 2024-12-16 DIAGNOSIS — Z72.820 SLEEP DEPRIVATION: ICD-10-CM

## 2024-12-16 DIAGNOSIS — R05.9 COUGH, UNSPECIFIED: ICD-10-CM

## 2024-12-16 DIAGNOSIS — J44.9 CHRONIC OBSTRUCTIVE PULMONARY DISEASE, UNSPECIFIED: ICD-10-CM

## 2024-12-16 DIAGNOSIS — R06.83 SNORING: ICD-10-CM

## 2024-12-16 DIAGNOSIS — J30.9 ALLERGIC RHINITIS, UNSPECIFIED: ICD-10-CM

## 2024-12-16 DIAGNOSIS — J45.909 UNSPECIFIED ASTHMA, UNCOMPLICATED: ICD-10-CM

## 2024-12-16 PROCEDURE — 71046 X-RAY EXAM CHEST 2 VIEWS: CPT

## 2024-12-16 PROCEDURE — 99215 OFFICE O/P EST HI 40 MIN: CPT

## 2024-12-16 RX ORDER — FLUTICASONE FUROATE, UMECLIDINIUM BROMIDE AND VILANTEROL TRIFENATATE 100; 62.5; 25 UG/1; UG/1; UG/1
100-62.5-25 POWDER RESPIRATORY (INHALATION)
Qty: 1 | Refills: 2 | Status: ACTIVE | COMMUNITY
Start: 2024-12-16 | End: 1900-01-01

## 2024-12-16 RX ORDER — BENZONATATE 200 MG/1
200 CAPSULE ORAL
Qty: 60 | Refills: 0 | Status: ACTIVE | COMMUNITY
Start: 2024-12-16 | End: 1900-01-01

## 2024-12-26 ENCOUNTER — OUTPATIENT (OUTPATIENT)
Dept: OUTPATIENT SERVICES | Facility: HOSPITAL | Age: 59
LOS: 1 days | End: 2024-12-26
Payer: COMMERCIAL

## 2024-12-26 DIAGNOSIS — Z98.52 VASECTOMY STATUS: Chronic | ICD-10-CM

## 2024-12-26 DIAGNOSIS — Z98.890 OTHER SPECIFIED POSTPROCEDURAL STATES: Chronic | ICD-10-CM

## 2024-12-26 DIAGNOSIS — Z95.810 PRESENCE OF AUTOMATIC (IMPLANTABLE) CARDIAC DEFIBRILLATOR: Chronic | ICD-10-CM

## 2024-12-26 DIAGNOSIS — G47.33 OBSTRUCTIVE SLEEP APNEA (ADULT) (PEDIATRIC): ICD-10-CM

## 2024-12-26 PROCEDURE — 95800 SLP STDY UNATTENDED: CPT

## 2024-12-26 PROCEDURE — 95800 SLP STDY UNATTENDED: CPT | Mod: 26

## 2024-12-27 ENCOUNTER — APPOINTMENT (OUTPATIENT)
Dept: HEART AND VASCULAR | Facility: CLINIC | Age: 59
End: 2024-12-27
Payer: COMMERCIAL

## 2024-12-27 ENCOUNTER — NON-APPOINTMENT (OUTPATIENT)
Age: 59
End: 2024-12-27

## 2024-12-27 PROCEDURE — 93296 REM INTERROG EVL PM/IDS: CPT

## 2024-12-27 PROCEDURE — 93295 DEV INTERROG REMOTE 1/2/MLT: CPT

## 2025-01-02 ENCOUNTER — APPOINTMENT (OUTPATIENT)
Dept: PULMONOLOGY | Facility: CLINIC | Age: 60
End: 2025-01-02
Payer: COMMERCIAL

## 2025-01-02 PROCEDURE — 99212 OFFICE O/P EST SF 10 MIN: CPT

## 2025-02-25 ENCOUNTER — APPOINTMENT (OUTPATIENT)
Dept: HEART AND VASCULAR | Facility: CLINIC | Age: 60
End: 2025-02-25

## 2025-04-03 ENCOUNTER — APPOINTMENT (OUTPATIENT)
Dept: PULMONOLOGY | Facility: CLINIC | Age: 60
End: 2025-04-03

## 2025-04-10 ENCOUNTER — NON-APPOINTMENT (OUTPATIENT)
Age: 60
End: 2025-04-10

## 2025-04-10 ENCOUNTER — APPOINTMENT (OUTPATIENT)
Dept: CARDIOLOGY | Facility: CLINIC | Age: 60
End: 2025-04-10
Payer: COMMERCIAL

## 2025-04-10 VITALS
WEIGHT: 246 LBS | HEART RATE: 73 BPM | DIASTOLIC BLOOD PRESSURE: 64 MMHG | BODY MASS INDEX: 36.43 KG/M2 | HEIGHT: 69 IN | SYSTOLIC BLOOD PRESSURE: 110 MMHG | OXYGEN SATURATION: 97 %

## 2025-04-10 DIAGNOSIS — I10 ESSENTIAL (PRIMARY) HYPERTENSION: ICD-10-CM

## 2025-04-10 DIAGNOSIS — R00.2 PALPITATIONS: ICD-10-CM

## 2025-04-10 DIAGNOSIS — Z95.810 PRESENCE OF AUTOMATIC (IMPLANTABLE) CARDIAC DEFIBRILLATOR: ICD-10-CM

## 2025-04-10 DIAGNOSIS — I47.20 VENTRICULAR TACHYCARDIA, UNSPECIFIED: ICD-10-CM

## 2025-04-10 DIAGNOSIS — I25.10 ATHEROSCLEROTIC HEART DISEASE OF NATIVE CORONARY ARTERY W/OUT ANGINA PECTORIS: ICD-10-CM

## 2025-04-10 PROCEDURE — 99214 OFFICE O/P EST MOD 30 MIN: CPT

## 2025-04-10 PROCEDURE — G2211 COMPLEX E/M VISIT ADD ON: CPT | Mod: NC

## 2025-04-10 PROCEDURE — 93000 ELECTROCARDIOGRAM COMPLETE: CPT

## 2025-04-10 RX ORDER — ATENOLOL 25 MG/1
25 TABLET ORAL DAILY
Qty: 30 | Refills: 1 | Status: ACTIVE | COMMUNITY
Start: 2025-04-10 | End: 1900-01-01

## 2025-05-28 ENCOUNTER — APPOINTMENT (OUTPATIENT)
Dept: CT IMAGING | Facility: CLINIC | Age: 60
End: 2025-05-28
Payer: COMMERCIAL

## 2025-05-28 ENCOUNTER — OUTPATIENT (OUTPATIENT)
Dept: OUTPATIENT SERVICES | Facility: HOSPITAL | Age: 60
LOS: 1 days | End: 2025-05-28
Payer: COMMERCIAL

## 2025-05-28 DIAGNOSIS — R93.89 ABNORMAL FINDINGS ON DIAGNOSTIC IMAGING OF OTHER SPECIFIED BODY STRUCTURES: ICD-10-CM

## 2025-05-28 DIAGNOSIS — Z98.52 VASECTOMY STATUS: Chronic | ICD-10-CM

## 2025-05-28 DIAGNOSIS — Z95.810 PRESENCE OF AUTOMATIC (IMPLANTABLE) CARDIAC DEFIBRILLATOR: Chronic | ICD-10-CM

## 2025-05-28 DIAGNOSIS — Z98.890 OTHER SPECIFIED POSTPROCEDURAL STATES: Chronic | ICD-10-CM

## 2025-05-28 PROCEDURE — 71250 CT THORAX DX C-: CPT

## 2025-05-28 PROCEDURE — 71250 CT THORAX DX C-: CPT | Mod: 26

## 2025-05-29 ENCOUNTER — APPOINTMENT (OUTPATIENT)
Dept: HEART AND VASCULAR | Facility: CLINIC | Age: 60
End: 2025-05-29

## 2025-06-05 ENCOUNTER — RX RENEWAL (OUTPATIENT)
Age: 60
End: 2025-06-05

## 2025-06-09 ENCOUNTER — APPOINTMENT (OUTPATIENT)
Dept: HEART AND VASCULAR | Facility: CLINIC | Age: 60
End: 2025-06-09
Payer: COMMERCIAL

## 2025-06-09 ENCOUNTER — RX RENEWAL (OUTPATIENT)
Age: 60
End: 2025-06-09

## 2025-06-09 ENCOUNTER — NON-APPOINTMENT (OUTPATIENT)
Age: 60
End: 2025-06-09

## 2025-06-09 PROCEDURE — 93295 DEV INTERROG REMOTE 1/2/MLT: CPT

## 2025-06-09 PROCEDURE — 93296 REM INTERROG EVL PM/IDS: CPT

## 2025-06-09 RX ORDER — ATENOLOL 25 MG/1
25 TABLET ORAL DAILY
Qty: 30 | Refills: 1 | Status: ACTIVE | COMMUNITY
Start: 2025-06-09 | End: 1900-01-01

## 2025-08-04 ENCOUNTER — RX RENEWAL (OUTPATIENT)
Age: 60
End: 2025-08-04

## 2025-09-08 ENCOUNTER — APPOINTMENT (OUTPATIENT)
Dept: HEART AND VASCULAR | Facility: CLINIC | Age: 60
End: 2025-09-08
Payer: COMMERCIAL

## 2025-09-08 ENCOUNTER — NON-APPOINTMENT (OUTPATIENT)
Age: 60
End: 2025-09-08

## 2025-09-08 PROCEDURE — 93295 DEV INTERROG REMOTE 1/2/MLT: CPT

## 2025-09-08 PROCEDURE — 93296 REM INTERROG EVL PM/IDS: CPT
